# Patient Record
Sex: MALE | Race: WHITE | NOT HISPANIC OR LATINO | Employment: FULL TIME | URBAN - METROPOLITAN AREA
[De-identification: names, ages, dates, MRNs, and addresses within clinical notes are randomized per-mention and may not be internally consistent; named-entity substitution may affect disease eponyms.]

---

## 2017-09-08 ENCOUNTER — COMMUNICATION - HEALTHEAST (OUTPATIENT)
Dept: FAMILY MEDICINE | Facility: CLINIC | Age: 47
End: 2017-09-08

## 2017-09-08 DIAGNOSIS — I10 ESSENTIAL HYPERTENSION: ICD-10-CM

## 2017-09-18 ENCOUNTER — COMMUNICATION - HEALTHEAST (OUTPATIENT)
Dept: TELEHEALTH | Facility: CLINIC | Age: 47
End: 2017-09-18

## 2017-10-06 ENCOUNTER — COMMUNICATION - HEALTHEAST (OUTPATIENT)
Dept: FAMILY MEDICINE | Facility: CLINIC | Age: 47
End: 2017-10-06

## 2017-10-12 ENCOUNTER — OFFICE VISIT - HEALTHEAST (OUTPATIENT)
Dept: FAMILY MEDICINE | Facility: CLINIC | Age: 47
End: 2017-10-12

## 2017-10-12 DIAGNOSIS — Z23 NEEDS FLU SHOT: ICD-10-CM

## 2017-10-12 DIAGNOSIS — I10 ESSENTIAL HYPERTENSION: ICD-10-CM

## 2017-10-12 DIAGNOSIS — J30.9 ALLERGIC RHINITIS: ICD-10-CM

## 2017-10-12 ASSESSMENT — MIFFLIN-ST. JEOR: SCORE: 1743.78

## 2017-10-12 NOTE — ASSESSMENT & PLAN NOTE
Uncontrolled on current regimen.  Medication changes made today from HCTZ alone to lisinopril HCTZ.  Patient to monitor home blood pressures with at home cuff for 2 weeks and send us numbers through my chart.  If still running high we will have him come in for a nurse check or follow-up.  Let us know if experiencing side effects to medications.

## 2017-10-17 ENCOUNTER — AMBULATORY - HEALTHEAST (OUTPATIENT)
Dept: FAMILY MEDICINE | Facility: CLINIC | Age: 47
End: 2017-10-17

## 2017-10-17 DIAGNOSIS — R73.9 HIGH BLOOD SUGAR: ICD-10-CM

## 2018-04-17 ENCOUNTER — COMMUNICATION - HEALTHEAST (OUTPATIENT)
Dept: FAMILY MEDICINE | Facility: CLINIC | Age: 48
End: 2018-04-17

## 2018-04-17 DIAGNOSIS — I10 ESSENTIAL HYPERTENSION: ICD-10-CM

## 2018-10-23 ENCOUNTER — COMMUNICATION - HEALTHEAST (OUTPATIENT)
Dept: FAMILY MEDICINE | Facility: CLINIC | Age: 48
End: 2018-10-23

## 2018-10-23 DIAGNOSIS — I10 ESSENTIAL HYPERTENSION: ICD-10-CM

## 2018-11-25 ENCOUNTER — COMMUNICATION - HEALTHEAST (OUTPATIENT)
Dept: FAMILY MEDICINE | Facility: CLINIC | Age: 48
End: 2018-11-25

## 2018-11-25 DIAGNOSIS — I10 ESSENTIAL HYPERTENSION: ICD-10-CM

## 2018-11-30 ENCOUNTER — OFFICE VISIT - HEALTHEAST (OUTPATIENT)
Dept: FAMILY MEDICINE | Facility: CLINIC | Age: 48
End: 2018-11-30

## 2018-11-30 DIAGNOSIS — M54.41 ACUTE BILATERAL LOW BACK PAIN WITH RIGHT-SIDED SCIATICA: ICD-10-CM

## 2018-11-30 DIAGNOSIS — I10 ESSENTIAL HYPERTENSION: ICD-10-CM

## 2018-11-30 DIAGNOSIS — J30.9 ALLERGIC RHINITIS: ICD-10-CM

## 2018-11-30 LAB
ANION GAP SERPL CALCULATED.3IONS-SCNC: 13 MMOL/L (ref 5–18)
BUN SERPL-MCNC: 19 MG/DL (ref 8–22)
CALCIUM SERPL-MCNC: 9.9 MG/DL (ref 8.5–10.5)
CHLORIDE BLD-SCNC: 97 MMOL/L (ref 98–107)
CO2 SERPL-SCNC: 23 MMOL/L (ref 22–31)
CREAT SERPL-MCNC: 0.89 MG/DL (ref 0.7–1.3)
GFR SERPL CREATININE-BSD FRML MDRD: >60 ML/MIN/1.73M2
GLUCOSE BLD-MCNC: 86 MG/DL (ref 70–125)
POTASSIUM BLD-SCNC: 3.6 MMOL/L (ref 3.5–5)
SODIUM SERPL-SCNC: 133 MMOL/L (ref 136–145)

## 2019-12-03 ENCOUNTER — COMMUNICATION - HEALTHEAST (OUTPATIENT)
Dept: FAMILY MEDICINE | Facility: CLINIC | Age: 49
End: 2019-12-03

## 2019-12-03 DIAGNOSIS — I10 ESSENTIAL HYPERTENSION: ICD-10-CM

## 2020-02-27 ENCOUNTER — OFFICE VISIT - HEALTHEAST (OUTPATIENT)
Dept: FAMILY MEDICINE | Facility: CLINIC | Age: 50
End: 2020-02-27

## 2020-02-27 DIAGNOSIS — Z72.0 NICOTINE ABUSE: ICD-10-CM

## 2020-02-27 DIAGNOSIS — I10 ESSENTIAL HYPERTENSION: ICD-10-CM

## 2020-02-27 DIAGNOSIS — J30.9 ALLERGIC RHINITIS: ICD-10-CM

## 2020-02-27 LAB
ANION GAP SERPL CALCULATED.3IONS-SCNC: 11 MMOL/L (ref 5–18)
BUN SERPL-MCNC: 17 MG/DL (ref 8–22)
CALCIUM SERPL-MCNC: 9.5 MG/DL (ref 8.5–10.5)
CHLORIDE BLD-SCNC: 98 MMOL/L (ref 98–107)
CO2 SERPL-SCNC: 24 MMOL/L (ref 22–31)
CREAT SERPL-MCNC: 0.83 MG/DL (ref 0.7–1.3)
GFR SERPL CREATININE-BSD FRML MDRD: >60 ML/MIN/1.73M2
GLUCOSE BLD-MCNC: 82 MG/DL (ref 70–125)
POTASSIUM BLD-SCNC: 3.9 MMOL/L (ref 3.5–5)
SODIUM SERPL-SCNC: 133 MMOL/L (ref 136–145)

## 2020-02-28 ENCOUNTER — COMMUNICATION - HEALTHEAST (OUTPATIENT)
Dept: FAMILY MEDICINE | Facility: CLINIC | Age: 50
End: 2020-02-28

## 2020-06-19 ENCOUNTER — OFFICE VISIT - HEALTHEAST (OUTPATIENT)
Dept: FAMILY MEDICINE | Facility: CLINIC | Age: 50
End: 2020-06-19

## 2020-06-19 DIAGNOSIS — H61.22 IMPACTED CERUMEN OF LEFT EAR: ICD-10-CM

## 2020-06-19 ASSESSMENT — MIFFLIN-ST. JEOR: SCORE: 1710.61

## 2020-08-05 ENCOUNTER — COMMUNICATION - HEALTHEAST (OUTPATIENT)
Dept: FAMILY MEDICINE | Facility: CLINIC | Age: 50
End: 2020-08-05

## 2020-08-05 ENCOUNTER — OFFICE VISIT - HEALTHEAST (OUTPATIENT)
Dept: FAMILY MEDICINE | Facility: CLINIC | Age: 50
End: 2020-08-05

## 2020-08-05 DIAGNOSIS — I10 ESSENTIAL HYPERTENSION: ICD-10-CM

## 2020-08-05 DIAGNOSIS — Z00.00 HEALTH CARE MAINTENANCE: ICD-10-CM

## 2020-08-05 DIAGNOSIS — Z12.5 SCREENING PSA (PROSTATE SPECIFIC ANTIGEN): ICD-10-CM

## 2020-08-05 DIAGNOSIS — Z76.89 SLEEP CONCERN: ICD-10-CM

## 2020-08-05 DIAGNOSIS — Z12.11 SCREEN FOR COLON CANCER: ICD-10-CM

## 2020-08-05 DIAGNOSIS — E87.1 HYPONATREMIA: ICD-10-CM

## 2020-08-05 DIAGNOSIS — Z72.0 NICOTINE ABUSE: ICD-10-CM

## 2020-08-05 LAB
ALBUMIN SERPL-MCNC: 4.4 G/DL (ref 3.5–5)
ALP SERPL-CCNC: 83 U/L (ref 45–120)
ALT SERPL W P-5'-P-CCNC: 34 U/L (ref 0–45)
ANION GAP SERPL CALCULATED.3IONS-SCNC: 11 MMOL/L (ref 5–18)
AST SERPL W P-5'-P-CCNC: 25 U/L (ref 0–40)
BILIRUB SERPL-MCNC: 0.6 MG/DL (ref 0–1)
BUN SERPL-MCNC: 14 MG/DL (ref 8–22)
CALCIUM SERPL-MCNC: 10.1 MG/DL (ref 8.5–10.5)
CHLORIDE BLD-SCNC: 96 MMOL/L (ref 98–107)
CHOLEST SERPL-MCNC: 203 MG/DL
CO2 SERPL-SCNC: 25 MMOL/L (ref 22–31)
CREAT SERPL-MCNC: 1 MG/DL (ref 0.7–1.3)
FASTING STATUS PATIENT QL REPORTED: YES
GFR SERPL CREATININE-BSD FRML MDRD: >60 ML/MIN/1.73M2
GLUCOSE BLD-MCNC: 94 MG/DL (ref 70–125)
HDLC SERPL-MCNC: 61 MG/DL
LDLC SERPL CALC-MCNC: 126 MG/DL
POTASSIUM BLD-SCNC: 4.8 MMOL/L (ref 3.5–5)
PROT SERPL-MCNC: 7.2 G/DL (ref 6–8)
PSA SERPL-MCNC: 0.6 NG/ML (ref 0–3.5)
SODIUM SERPL-SCNC: 132 MMOL/L (ref 136–145)
TRIGL SERPL-MCNC: 79 MG/DL

## 2020-08-05 ASSESSMENT — MIFFLIN-ST. JEOR: SCORE: 1699.38

## 2020-08-06 ENCOUNTER — COMMUNICATION - HEALTHEAST (OUTPATIENT)
Dept: FAMILY MEDICINE | Facility: CLINIC | Age: 50
End: 2020-08-06

## 2020-08-18 ENCOUNTER — NURSE TRIAGE (OUTPATIENT)
Dept: NURSING | Facility: CLINIC | Age: 50
End: 2020-08-18

## 2020-08-19 ENCOUNTER — OFFICE VISIT - HEALTHEAST (OUTPATIENT)
Dept: FAMILY MEDICINE | Facility: CLINIC | Age: 50
End: 2020-08-19

## 2020-08-19 DIAGNOSIS — J02.9 SORE THROAT: ICD-10-CM

## 2020-08-19 DIAGNOSIS — R06.02 SHORTNESS OF BREATH: ICD-10-CM

## 2020-08-19 DIAGNOSIS — R50.9 FEVER AND CHILLS: ICD-10-CM

## 2020-08-19 DIAGNOSIS — J02.0 ACUTE STREPTOCOCCAL PHARYNGITIS: ICD-10-CM

## 2020-08-19 LAB — DEPRECATED S PYO AG THROAT QL EIA: ABNORMAL

## 2020-08-21 ENCOUNTER — COMMUNICATION - HEALTHEAST (OUTPATIENT)
Dept: SCHEDULING | Facility: CLINIC | Age: 50
End: 2020-08-21

## 2020-08-24 ENCOUNTER — COMMUNICATION - HEALTHEAST (OUTPATIENT)
Dept: SLEEP MEDICINE | Facility: CLINIC | Age: 50
End: 2020-08-24

## 2021-03-06 ENCOUNTER — COMMUNICATION - HEALTHEAST (OUTPATIENT)
Dept: FAMILY MEDICINE | Facility: CLINIC | Age: 51
End: 2021-03-06

## 2021-03-06 DIAGNOSIS — I10 ESSENTIAL HYPERTENSION: ICD-10-CM

## 2021-03-07 RX ORDER — LISINOPRIL AND HYDROCHLOROTHIAZIDE 20; 25 MG/1; MG/1
1 TABLET ORAL DAILY
Qty: 90 TABLET | Refills: 1 | Status: SHIPPED | OUTPATIENT
Start: 2021-03-07 | End: 2021-09-28

## 2021-03-10 ENCOUNTER — COMMUNICATION - HEALTHEAST (OUTPATIENT)
Dept: FAMILY MEDICINE | Facility: CLINIC | Age: 51
End: 2021-03-10

## 2021-03-10 DIAGNOSIS — J30.9 ALLERGIC RHINITIS: ICD-10-CM

## 2021-04-09 ENCOUNTER — RECORDS - HEALTHEAST (OUTPATIENT)
Dept: ADMINISTRATIVE | Facility: OTHER | Age: 51
End: 2021-04-09

## 2021-05-25 ENCOUNTER — RECORDS - HEALTHEAST (OUTPATIENT)
Dept: ADMINISTRATIVE | Facility: CLINIC | Age: 51
End: 2021-05-25

## 2021-05-30 ENCOUNTER — RECORDS - HEALTHEAST (OUTPATIENT)
Dept: ADMINISTRATIVE | Facility: CLINIC | Age: 51
End: 2021-05-30

## 2021-05-31 VITALS — HEIGHT: 74 IN | BODY MASS INDEX: 23.17 KG/M2 | WEIGHT: 180.56 LBS

## 2021-06-02 VITALS — BODY MASS INDEX: 22.65 KG/M2 | WEIGHT: 176.38 LBS

## 2021-06-03 NOTE — TELEPHONE ENCOUNTER
RN cannot approve Refill Request    RN can NOT refill this medication PCP messaged that patient is overdue for Labs and Office Visit. Last office visit: 11/30/2018 Umm Oneill FNP Last Physical: Visit date not found Last MTM visit: Visit date not found Last visit same specialty: 11/30/2018 Umm Oneill FNP.  Next visit within 3 mo: Visit date not found  Next physical within 3 mo: Visit date not found      Maribel Sousa, [facility] Triage/Med Refill 12/3/2019    Requested Prescriptions   Pending Prescriptions Disp Refills     lisinopril-hydrochlorothiazide (PRINZIDE,ZESTORETIC) 20-25 mg per tablet [Pharmacy Med Name: Lisinopril-hydroCHLOROthiazide Oral Tablet 20-25 MG] 30 tablet 2     Sig: Take 1 tablet by mouth daily.       Diuretics/Combination Diuretics Refill Protocol  Failed - 12/3/2019  7:02 AM        Failed - Visit with PCP or prescribing provider visit in past 12 months     Last office visit with prescriber/PCP: 11/30/2018 Umm Oneill FNP OR same dept: Visit date not found OR same specialty: 11/30/2018 Umm Oneill FNP  Last physical: Visit date not found Last MTM visit: Visit date not found   Next visit within 3 mo: Visit date not found  Next physical within 3 mo: Visit date not found  Prescriber OR PCP: BERTA Pineda  Last diagnosis associated with med order: 1. Essential hypertension  - lisinopril-hydrochlorothiazide (PRINZIDE,ZESTORETIC) 20-25 mg per tablet [Pharmacy Med Name: Lisinopril-hydroCHLOROthiazide Oral Tablet 20-25 MG]; Take 1 tablet by mouth daily.  Dispense: 30 tablet; Refill: 2    If protocol passes may refill for 12 months if within 3 months of last provider visit (or a total of 15 months).             Failed - Serum Potassium in past 12 months      No results found for: LN-POTASSIUM          Failed - Serum Sodium in past 12 months      No results found for: LN-SODIUM          Failed - Serum Creatinine in past 12 months      Creatinine   Date Value Ref Range Status   11/30/2018 0.89 0.70 -  1.30 mg/dL Final             Passed - Blood pressure on file in past 12 months     BP Readings from Last 1 Encounters:   07/28/19 116/68

## 2021-06-04 VITALS
SYSTOLIC BLOOD PRESSURE: 132 MMHG | DIASTOLIC BLOOD PRESSURE: 88 MMHG | BODY MASS INDEX: 22.23 KG/M2 | WEIGHT: 173.25 LBS | RESPIRATION RATE: 16 BRPM | TEMPERATURE: 98.4 F | HEIGHT: 74 IN | HEART RATE: 86 BPM

## 2021-06-04 VITALS
HEART RATE: 94 BPM | WEIGHT: 175.6 LBS | TEMPERATURE: 97.5 F | RESPIRATION RATE: 16 BRPM | BODY MASS INDEX: 22.55 KG/M2 | SYSTOLIC BLOOD PRESSURE: 122 MMHG | DIASTOLIC BLOOD PRESSURE: 86 MMHG

## 2021-06-04 VITALS
WEIGHT: 173.4 LBS | SYSTOLIC BLOOD PRESSURE: 121 MMHG | HEART RATE: 92 BPM | DIASTOLIC BLOOD PRESSURE: 80 MMHG | RESPIRATION RATE: 16 BRPM | BODY MASS INDEX: 22.98 KG/M2 | HEIGHT: 73 IN | TEMPERATURE: 97.7 F

## 2021-06-04 VITALS
BODY MASS INDEX: 22.41 KG/M2 | TEMPERATURE: 99.2 F | OXYGEN SATURATION: 99 % | HEART RATE: 104 BPM | RESPIRATION RATE: 18 BRPM | DIASTOLIC BLOOD PRESSURE: 76 MMHG | WEIGHT: 171 LBS | SYSTOLIC BLOOD PRESSURE: 106 MMHG

## 2021-06-06 NOTE — PROGRESS NOTES
DIAGNOSIS:  1. Essential hypertension                          The diagnosis was confirmed.  The condition is stable/controlled on LISINOPRIL-HCTZ and no side effects  have been noted.  The appropriate follow up labs  ( BMP )have been ordered  (OR ARE UP TO DATE) and medication refills ordered if requested.   lisinopriL-hydrochlorothiazide (PRINZIDE,ZESTORETIC) 20-25 mg per tablet    Basic Metabolic Panel   2. Allergic rhinitis   Stable on flonase fluticasone propionate (FLONASE) 50 mcg/actuation nasal spray   3. Nicotine abuse   Uses nicotine chew,  Will try losenges to get off.  Further discuss at upcoming physical        PLAN:     Full physical after age 50.  We will do fasting labs (CMP,  FLP)  and PSA at that time    Check BMPtoday    meds refilled    Quit chew.   Try losenges.   Consider chantix.            HPI:  estab care.  No issues with meds.  Dad recently dx with prostate cancer at 74  Chews a can a week.  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  So significant joint pain.        Current Outpatient Medications on File Prior to Visit   Medication Sig Dispense Refill     [DISCONTINUED] fluticasone (FLONASE) 50 mcg/actuation nasal spray 1 spray into each nostril daily. 16 g 5     [DISCONTINUED] lisinopril-hydrochlorothiazide (PRINZIDE,ZESTORETIC) 20-25 mg per tablet Take 1 tablet by mouth daily. 90 tablet 0     blood pressure monitor Kit Use 1 Device As Directed daily. 1 each 0     No current facility-administered medications on file prior to visit.        Pmh: reviewed  Psh: reviewed  Allergy:  reviewed      EXAM:    /86   Pulse 94   Temp 97.5  F (36.4  C) (Oral)   Resp 16   Wt 175 lb 9.6 oz (79.7 kg)   BMI 22.55 kg/m    GEN:   ALERT, NAD, ORIENTED TIMES THREE  NECK: SUPPLE WITHOUT ADENOPATHY OR THYROMEGALY  LUNGS: CTA  COR: RRR WITHOUT MURMUR  SKIN: NO RASH , ULCERS OR LESIONS NOTED  EXT: WITHOUT EDEMA/SWELLING    No results found for this or any previous visit (from the past 168  hour(s)).

## 2021-06-09 NOTE — PROGRESS NOTES
"Assessment/Plan:    Adelfo Fierro is a 50 y.o. male presenting for:    1. Impacted cerumen of left ear  I was able to curette a significant amount of cerumen today.  I did have my assistant come and rinse his ear as well.  Patient had resolution of his symptoms for the most part.  I did discuss that with seasonal allergies he may have a component of serous otitis as well and we discussed how to combat that.        There are no discontinued medications.        Chief Complaint:  Chief Complaint   Patient presents with     Ear Fullness     Bilateral plugged ears- hard time hearing       Subjective:   Adelfo Fierro is a pleasant 50-year-old gentleman presenting to the clinic today with concerns over a clogged feeling in his left ear.  He states that occasionally his ear will \"pop\" and he will be able to hear a bit better.  He does have seasonal allergies.    He states that at one point he has had severe on the left washed out.  His ear is not draining.  It is not painful.    12 point review of systems completed and negative except for what has been described above    Social History     Tobacco Use   Smoking Status Former Smoker   Smokeless Tobacco Current User     Types: Chew       Current Outpatient Medications   Medication Sig     blood pressure monitor Kit Use 1 Device As Directed daily.     fluticasone propionate (FLONASE) 50 mcg/actuation nasal spray 1 spray into each nostril daily.     lisinopriL-hydrochlorothiazide (PRINZIDE,ZESTORETIC) 20-25 mg per tablet Take 1 tablet by mouth daily.         Objective:  Vitals:    06/19/20 1356   BP: 132/88   Pulse: 86   Resp: 16   Temp: 98.4  F (36.9  C)   TempSrc: Oral   Weight: 173 lb 4 oz (78.6 kg)   Height: 6' 2\" (1.88 m)       Body mass index is 22.24 kg/m .    Vital signs reviewed and stable  General: No acute distress  Psych: Appropriate affect  HEENT: moist mucous membranes, tympanic membrane on the right is pearly gray with no cerumen, tympanic membrane on the left " is completely obscured by copious amounts of thick and sticky cerumen.      Cerumen was curetted by myself and lavaged by my assistant today.  Partial visualization of the tympanic membrane appears normal.  Lymph: no cervical or supraclavicular lymphadenopathy    Skin: warm and dry with no rash         This note has been dictated and transcribed using voice recognition software.   Any errors in transcription are unintentional and inherent to the software.

## 2021-06-10 NOTE — PATIENT INSTRUCTIONS - HE
Check with insurance on Shingrix coverage (for Shingles prevention)    Schedule colonoscopy    Fasting labs    Referral for a sleep study    I have counseled the patient for tobacco cessation and the follow up will occur  at the next visit.    
ringing in ear, "sensation" in head

## 2021-06-10 NOTE — PROGRESS NOTES
[facility] Note                                                        Date of Visit: 8/19/2020     Chief Complaint   Adelfo Fierro is a(n) 50 y.o. White or  male who presents to [facility] with the following complaint(s):  Sore Throat (x 1 wk  Patient is currently taking antibiotics prescribed on 8/15/2020); Dizziness (x 1 Day); Extremity Weakness (x 3 Days ); Chills (last sunday Temp today is 99.2 Patient is currently taking Tylenol and Ibufrofen); and Suspected Covid (Patient reported being out of State last wknd x 3 Days  in [place])       Assessment and Plan   1. Acute streptococcal pharyngitis    2. Sore throat  - Symptomatic COVID-19 Virus (CORONAVIRUS) PCR; Future  - Rapid Strep A Screen-Throat  - Symptomatic COVID-19 Virus (CORONAVIRUS) PCR  - COVID-19 Virus PCR MRF    3. Fever and chills    - Symptomatic COVID-19 Virus (CORONAVIRUS) PCR; Future  - Symptomatic COVID-19 Virus (CORONAVIRUS) PCR  - COVID-19 Virus PCR MRF    4. Shortness of breath  - Symptomatic COVID-19 Virus (CORONAVIRUS) PCR; Future  - Symptomatic COVID-19 Virus (CORONAVIRUS) PCR  - COVID-19 Virus PCR MRF      Patient with hypertension and seasonal allergies presenting with 1-week history of sore throat and requesting testing for strep. He was seen at [facility] in [place], [place] on 8/15/2020, where strep testing was not completed but he was empirically treated for bacterial pharyngitis with amoxicillin 875 mg two times a day x 10 days; records reviewed through Care Everywhere. Sore throat has gradually improved since starting the amoxicillin, but he continues to experience additional symptoms of ongoing low-grade fevers, headache, dizziness, fatigue, and mild shortness of breath. 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 Appearance of the oropharynx is more consistent with herpangina that strep, but rapid strep testing is actually positive today. Instructed patient to complete the full course of amoxicillin as prescribed on 8/15/2020. Recommended use of a probiotic while taking this antibiotic. Discussed symptomatic / supportive cares and prevention of reinfection. Ruling out concurrent COVID-19 infection. Recommend home isolation pending this result. Work excuse provided for 8/19/2020-8/21/2020.     Counseled patient regarding assessment and plan for evaluation and treatment. Questions were answered. See AVS for the specific written instructions and educational handout(s) regarding strep pharyngitis and COVID-19 that were provided at the conclusion of the visit.     Discussed signs / symptoms that warrant urgent / emergent medical attention.     Follow up with Primary Care in 5 days if symptoms persist.     History of Present Illness   Primary symptom: Sore Throat / Cold / Cough / Flu  Onset: 1 week ago  Progression: Developed sore throat 1 week ago. Sore throat worsened significantly over the next 2 days.   Fevers: Low-grade, with ibuprofen in his system, Tmax  99.2 F  Chills: 4 day ago only  Sore throat: Yes, severe, now improving  Dysgeusia: No  Anosmia: No  Nasal congestion: Mild, related to seasonal allergies  Rhinorrhea: No  Ear pain: No  Headache: Yes  Body aches: Yes, especially in the legs. Does not appreciated swelling in the legs.   Cough: No  Shortness of breath: Mild  GI symptoms: None  Rash: No  Additional symptoms: Fatigue and dizziness for the past 3 days.   Home therapies utilized: Has been taking ibuprofen 800 mg every 4-5 hours. Not taking acetaminophen. Using cough drops. Was seen at an Urgent Care in [place] on 8/15/2020, where strep testing was not completed and amoxicillin 875 mg two times a day x 10 days was empirically prescribed.   Underlying  lung disease: No  Exposure to strep: No  Exposure to influenza: No  Exposure to COVID-19: No  Other ill contacts: No family members have been ill. No known exposure to Mono in recent months.   Recent travel: Travelled to / from [place] this past weekend, while ill, to move his two daughters in at college. He found out today that the mother of one of his daughter's sorority sisters has since tested positive for COVID-19.      Review of Systems   Review of Systems   All other systems reviewed and are negative.       Physical Exam   Vitals:    08/19/20 1347   BP: 106/76   Patient Site: Right Arm   Patient Position: Sitting   Cuff Size: Adult Regular   Pulse: (!) 104   Resp: 18   Temp: 99.2  F (37.3  C)   TempSrc: Oral   SpO2: 99%   Weight: 171 lb (77.6 kg)     Physical Exam  Vitals signs and nursing note reviewed.   Constitutional:       General: He is not in acute distress.     Appearance: He is well-developed and normal weight. He is not ill-appearing, toxic-appearing or diaphoretic.   HENT:      Head: Normocephalic and atraumatic.      Right Ear: Tympanic membrane, ear canal and external ear normal.      Left Ear: Tympanic membrane, ear canal and external ear normal.      Nose: No mucosal edema or rhinorrhea.      Mouth/Throat:      Mouth: Mucous membranes are moist. No oral lesions.      Palate: Lesions (several gray ulcerations with erythematous bases) present.      Pharynx: Uvula midline. Posterior oropharyngeal erythema present. No oropharyngeal exudate or uvula swelling.      Tonsils: No tonsillar exudate or tonsillar abscesses. 1+ on the right. 1+ on the left.   Eyes:      General: Lids are normal.      Conjunctiva/sclera: Conjunctivae normal.   Neck:      Musculoskeletal: Neck supple. No edema or erythema.   Cardiovascular:      Rate and Rhythm: Normal rate and regular rhythm.      Heart sounds: S1 normal and S2 normal. No murmur. No friction rub. No gallop.    Pulmonary:      Effort: Pulmonary effort is  normal.      Breath sounds: Normal breath sounds. No stridor. No wheezing, rhonchi or rales.   Lymphadenopathy:      Cervical: No cervical adenopathy.   Skin:     General: Skin is warm and dry.      Coloration: Skin is not pale.      Findings: No rash.   Neurological:      General: No focal deficit present.      Mental Status: He is alert and oriented to person, place, and time.          Diagnostic Studies   Laboratory:  Results for orders placed or performed in visit on 08/19/20   Rapid Strep A Screen-Throat    Specimen: Throat   Result Value Ref Range    Rapid Strep A Antigen Group A Strep detected (!) No Group A Strep detected, presumptive negative     Radiology:  N/A    Electrocardiogram:  N/A     Procedure Note   N/A     Pertinent History   The following portions of the patient's history were reviewed and updated as appropriate: allergies, current medications, past family history, past medical history, past social history, past surgical history and problem list.    Patient has Essential hypertension and Allergic rhinitis on their problem list.    Patient has a past medical history of Essential hypertension.    Patient has no past surgical history on file.    Patient's family history includes Breast cancer in his maternal grandmother; Heart disease (age of onset: 80) in his paternal grandfather; Hypertension in his father, mother, and paternal grandfather; Stroke (age of onset: 45) in his maternal uncle.    Patient reports that he has quit smoking. His smokeless tobacco use includes chew. He reports current alcohol use of about 14.0 standard drinks of alcohol per week.     Portions of this note have been dictated using voice recognition software. Any grammatical or contextual distortions are unintentional and inherent to the software.    Gerardo Sahni MD  [facility]

## 2021-06-10 NOTE — TELEPHONE ENCOUNTER
----- Message from Graeme Arriola MD sent at 8/5/2020  8:38 PM CDT -----  Rey Emanuel:  Your baseline PSA is NORMAL.  We will recheck this at your next yearly physical.  The sodium continues to run slightly low (one point lower than last time).  As we said last time this is likely secondary to your BP med.  If it were to run any lower then we would want to switch up your BP med.  I would suggest a recheck or your sodium in 6 months.  The cholesterol panel is excellent.  The remaining labs are NORMAL.

## 2021-06-13 NOTE — PROGRESS NOTES
ASSESSMENT:  1. Essential hypertension  Basic Metabolic Panel   2. Allergic rhinitis  fluticasone (FLONASE) 50 mcg/actuation nasal spray   3. Needs flu shot           PLAN:  Flu shot given by nursing.    Essential hypertension  Uncontrolled on current regimen.  Medication changes made today from HCTZ alone to lisinopril HCTZ.  Patient to monitor home blood pressures with at home cuff for 2 weeks and send us numbers through my chart.  If still running high we will have him come in for a nurse check or follow-up.  Let us know if experiencing side effects to medications.    Allergic rhinitis  Under good control if uses Flonase on a regular basis.  Refill provided today for as needed use.      There are no Patient Instructions on file for this visit.    Orders Placed This Encounter   Procedures     Influenza, Seasonal,Quad Inj, 36+ MOS     Basic Metabolic Panel     Medications Discontinued During This Encounter   Medication Reason     fluticasone (FLONASE) 50 mcg/actuation nasal spray Reorder     oxyCODONE (ROXICODONE) 5 MG immediate release tablet Therapy completed       No Follow-up on file.    CHIEF COMPLAINT:  Chief Complaint   Patient presents with     Establish Care     Medication Management       HISTORY OF PRESENT ILLNESS:  Adelfo is a 47 y.o. male today to establish care and for medication check.  She with history of allergies and hypertension, otherwise healthy and no other chronic medical conditions.  Currently taking HCTZ for hypertension, but blood pressure is high today.  The last few times he has checked he is noticed it to be high and wonders if he should change medications.  Otherwise he has been feeling well.  He requests refill on his Flonase today.  He had a clavicle fracture lastSummer, however pain and problems related to that have resolved.  Otherwise medical history quite benign, no surgical history.  Family history was reviewed and updated.  He would like to get flu shot today as well as he is  "here.    REVIEW OF SYSTEMS:      Pertinent items are noted in HPI.  All other systems are negative  PFSH:  Family History   Problem Relation Age of Onset     Hypertension Mother      Hypertension Father      Stroke Maternal Uncle 45          Breast cancer Maternal Grandmother      Heart disease Paternal Grandfather 80     MI     Hypertension Paternal Grandfather          TOBACCO USE:  History   Smoking Status     Former Smoker   Smokeless Tobacco     Current User     Types: Chew       VITALS:  Vitals:    10/12/17 0959 10/12/17 1024   BP: (!) 142/110 (!) 150/110   Patient Site: Right Arm    Patient Position: Sitting    Cuff Size: Adult Regular    Pulse: 72    Resp: 16    Temp: 97.8  F (36.6  C)    TempSrc: Oral    Weight: 180 lb 9 oz (81.9 kg)    Height: 6' 2\" (1.88 m)      Wt Readings from Last 3 Encounters:   10/12/17 180 lb 9 oz (81.9 kg)   16 177 lb 4 oz (80.4 kg)   16 175 lb 12 oz (79.7 kg)       PHYSICAL EXAM:   BP (!) 150/110  Pulse 72  Temp 97.8  F (36.6  C) (Oral)   Resp 16  Ht 6' 2\" (1.88 m)  Wt 180 lb 9 oz (81.9 kg)  BMI 23.18 kg/m2  General appearance: alert, appears stated age and cooperative  Lungs: clear to auscultation bilaterally  Heart: regular rate and rhythm, S1, S2 normal, no murmur, click, rub or gallop    DATA REVIEWED:  Additional History from Old Records Summarized (2): 0   Decision to Obtain Records (1): 0  Radiology Tests Summarized or Ordered (1): 0  Labs Reviewed or Ordered (1): 1  Medicine Test Summarized or Ordered (1): 0  Independent Review of EKG or X-RAY(2 each): 0    The visit lasted a total of 25 minutes face to face with the patient. Over 50% of the time was spent counseling and educating the patient about plan of care.    MEDICATIONS:  Current Outpatient Prescriptions   Medication Sig Dispense Refill     hydroCHLOROthiazide (HYDRODIURIL) 25 MG tablet TAKE 1 TABLET (25 MG TOTAL) BY MOUTH DAILY. 30 tablet 0     blood pressure monitor Kit Use 1 Device As " Directed daily. 1 each 0     fluticasone (FLONASE) 50 mcg/actuation nasal spray 1 spray into each nostril daily. 16 g 5     lisinopril-hydrochlorothiazide (PRINZIDE,ZESTORETIC) 20-25 mg per tablet Take 1 tablet by mouth daily. 30 tablet 5     No current facility-administered medications for this visit.        This note has been dictated using voice recognition software. Any grammatical or context distortions are unintentional and inherent to the software

## 2021-06-15 NOTE — TELEPHONE ENCOUNTER
Refill Approved    Rx renewed per Medication Renewal Policy. Medication was last renewed on 2/27/20.    Graeme Flores, [facility] Connection Triage/Med Refill 3/7/2021     Requested Prescriptions   Pending Prescriptions Disp Refills     lisinopriL-hydrochlorothiazide (PRINZIDE,ZESTORETIC) 20-25 mg per tablet [Pharmacy Med Name: Lisinopril-hydroCHLOROthiazide Oral Tablet 20-25 MG] 90 tablet 0     Sig: Take 1 tablet by mouth daily.       Diuretics/Combination Diuretics Refill Protocol  Passed - 3/6/2021  2:01 AM        Passed - Visit with PCP or prescribing provider visit in past 12 months     Last office visit with prescriber/PCP: 2/27/2020 Graeme Arriola MD OR same dept: 6/19/2020 Debbie Mott MD OR same specialty: 6/19/2020 Debbie Mott MD  Last physical: 8/5/2020 Last MTM visit: Visit date not found   Next visit within 3 mo: Visit date not found  Next physical within 3 mo: Visit date not found  Prescriber OR PCP: Graeme Arriola MD  Last diagnosis associated with med order: 1. Essential hypertension  - lisinopriL-hydrochlorothiazide (PRINZIDE,ZESTORETIC) 20-25 mg per tablet [Pharmacy Med Name: Lisinopril-hydroCHLOROthiazide Oral Tablet 20-25 MG]; Take 1 tablet by mouth daily.  Dispense: 90 tablet; Refill: 0    If protocol passes may refill for 12 months if within 3 months of last provider visit (or a total of 15 months).             Passed - Serum Potassium in past 12 months      Lab Results   Component Value Date    Potassium 4.8 08/05/2020             Passed - Serum Sodium in past 12 months      Lab Results   Component Value Date    Sodium 132 (L) 08/05/2020             Passed - Blood pressure on file in past 12 months     BP Readings from Last 1 Encounters:   08/19/20 106/76             Passed - Serum Creatinine in past 12 months      Creatinine   Date Value Ref Range Status   08/05/2020 1.00 0.70 - 1.30 mg/dL Final

## 2021-06-15 NOTE — TELEPHONE ENCOUNTER
Refill Approved    Rx renewed per Medication Renewal Policy. Medication was last renewed on 2/27/20.    Graeme Flores, [facility] Connection Triage/Med Refill 3/11/2021     Requested Prescriptions   Pending Prescriptions Disp Refills     fluticasone propionate (FLONASE) 50 mcg/actuation nasal spray [Pharmacy Med Name: Fluticasone Propionate Nasal Suspension 50 MCG/ACT] 16 g 0     Sig: inhale one spray into each nostril daily       Nasal Steroid Refill Protocol Passed - 3/10/2021  8:41 AM        Passed - Patient has had office visit/physical in last 2 years     Last office visit with prescriber/PCP: 2/27/2020 OR same dept: 6/19/2020 Debbie Mott MD OR same specialty: 6/19/2020 Debbie Mott MD Last physical: 8/5/2020 Last MTM visit: Visit date not found    Next appt within 3 mo: Visit date not found  Next physical within 3 mo: Visit date not found  Prescriber OR PCP: Graeme Arriola MD  Last diagnosis associated with med order: 1. Allergic rhinitis  - fluticasone propionate (FLONASE) 50 mcg/actuation nasal spray [Pharmacy Med Name: Fluticasone Propionate Nasal Suspension 50 MCG/ACT]; inhale one spray into each nostril daily  Dispense: 16 g; Refill: 0     If protocol passes may refill for 12 months if within 3 months of last provider visit (or a total of 15 months).

## 2021-06-16 PROBLEM — J30.9 ALLERGIC RHINITIS: Status: ACTIVE | Noted: 2017-10-12

## 2021-06-18 NOTE — PATIENT INSTRUCTIONS - HE
"Patient Instructions by Gerardo Sahni MD at 8/19/2020  1:40 PM     Author: Gerardo Sahni MD Service: -- Author Type: Physician    Filed: 8/19/2020  2:45 PM Encounter Date: 8/19/2020 Status: Addendum    : Gerardo Sahni MD (Physician)    Related Notes: Original Note by Gerardo Sahni MD (Physician) filed at 8/19/2020  2:44 PM       -Rapid strep test is positive.  -Complete the full course of amoxicillin as prescribed on 8/15/2020 to treat this infection.  -Recommend use of a probiotic while taking this antibiotic to prevent diarrhea.  This product is available over the counter.  -May use acetaminophen and / or ibuprofen as needed for pain and fever.  -Discard your toothbrush 48 hours after starting your antibiotic to prevent reinfection.  -COVID-19 testing is in process.    Discharge Instructions for COVID-19 Patients  You have--or may have--COVID-19. Please follow the instructions listed below.   If you have a weakened immune system, discuss with your doctor any other actions you need to take.  How can I protect others?  If you have symptoms (fever, cough, body aches or trouble breathing):    Stay home and away from others (self-isolate) until:  ? At least 10 days have passed since your symptoms started. And?  ? You've had no fever--and no medicine that reduces fever--for 3 full days (72 hours). And?  ? Your other symptoms have resolved (gotten better).  If you don't show symptoms, but testing showed that you have COVID-19:    Stay home and away from others (self-isolate) until at least 10 days have passed since the date of your first positive COVID-19 test.  During this time    Stay in your own room, even for meals. Use your own bathroom if you can.    Stay away from others in your home. No hugging, kissing or shaking hands. No visitors.    Don't go to work, school or anywhere else.    Clean \"high touch\" surfaces often (doorknobs, counters, handles). Use household cleaning spray or " wipes. You'll find a full list of  on the EPA website: www.epa.gov/pesticide-registration/list-n-disinfectants-use-against-sars-cov-2.    Cover your mouth and nose with a mask, tissue or wash cloth to avoid spreading germs.    Wash your hands and face often. Use soap and water.    Caregivers in these groups are at risk for severe illness due to COVID-19:  ? People 65 years and older  ? People who live in a nursing home or long-term care facility  ? People with chronic disease (lung, heart, cancer, diabetes, kidney, liver, immunologic)  ? People who have a weakened immune system, including those who:    Are in cancer treatment    Take medicine that weakens the immune system, such as corticosteroids    Had a bone marrow or organ transplant    Have an immune deficiency    Have poorly controlled HIV or AIDS    Are obese (body mass index of 40 or higher)    Smoke regularly    Caregivers should wear gloves while washing dishes, handling laundry and cleaning bedrooms and bathrooms.    Use caution when washing and drying laundry: Don't shake dirty laundry and use the warmest water setting that you can.    For more tips on managing your health at home, go to www.cdc.gov/coronavirus/2019-ncov/downloads/10Things.pdf.  How can I take care of myself at home?  1. Get lots of rest. Drink extra fluids (unless a doctor has told you not to).  2. Take Tylenol (acetaminophen) for fever or pain. If you have liver or kidney problems, ask your family doctor if it's okay to take Tylenol.     Adults can take either:  ? 650 mg (two 325 mg pills) every 4 to 6 hours, or?  ? 1,000 mg (two 500 mg pills) every 8 hours as needed.  ? Note: Don't take more than 3,000 mg in one day. Acetaminophen is found in many medicines (both prescribed and over-the-counter medicines). Read all labels to be sure you don't take too much.  For children, check the Tylenol bottle for the right dose. The dose is based on the child's age or weight.  3. If you  have other health problems (like cancer, heart failure, an organ transplant or severe kidney disease): Call your specialty clinic if you don't feel better in the next 2 days.  4. Know when to call 911. Emergency warning signs include:  ? Trouble breathing or shortness of breath  ? Pain or pressure in the chest that doesn't go away  ? Feeling confused like you haven't felt before, or not being able to wake up  ? Bluish-colored lips or face  5. Your doctor may have prescribed a blood thinner medicine. Follow their instructions.  Where can I get more information?    [facility] - About COVID-19:   [email or web address]    CDC - What to Do If You're Sick: www.cdc.gov/coronavirus/2019-ncov/about/steps-when-sick.html    [facility] - Ending Home Isolation: www.cdc.gov/coronavirus/2019-ncov/hcp/disposition-in-home-patients.html    [facility] - Caring for Someone: www.cdc.gov/coronavirus/2019-ncov/if-you-are-sick/care-for-someone.html    [facility] - Interim Guidance for Hospital Discharge to Home: www.[facility].[place]./diseases/coronavirus/hcp/hospdischarge.pdf    [facility] clinical trials (COVID-19 research studies): clinicalaffairs.[facility]-clinical-trials    Below are the COVID-19 hotlines at the [place] [facility] ([facility]). Interpreters are available.  ? For health questions: Call 079-307-0062 or 1-299.731.4050 (7 a.m. to 7 p.m.)  ? For questions about schools and childcare: Call 024-679-9225 or 1-541.926.6132 (7 a.m. to 7 p.m.)    For informational purposes only. Not to replace the advice of your health care provider. Clinically reviewed by the Infection Prevention Team.Copyright   2020 [employer]. All rights reserved. [employer] 154052 - 06/20.    Patient Education     Pharyngitis: Strep (Confirmed)    You have had a positive test for strep throat. Strep throat is a contagious illness. It is spread by coughing, kissing or by touching others after touching your mouth or nose.  Symptoms include throat pain that is worse with swallowing, aching all over, headache, and fever. It is treated with antibiotic medicine. This should help you start to feel better in 1 to 2 days.  Home care    Rest at home. Drink plenty of fluids to you won't get dehydrated.    No work or school for the first 2 days of taking the antibiotics. After this time, you will not be contagious. You can then return to school or work if you are feeling better.     Take antibiotic medicine for the full 10 days, even if you feel better. This is very important to ensure the infection is treated. It is also important to prevent medicine-resistant germs from developing. If you were given an antibiotic shot, you don't need any more antibiotics.    You may use acetaminophen or ibuprofen to control pain or fever, unless another medicine was prescribed for this. Talk with your healthcare provider before taking these medicines if you have chronic liver or kidney disease. Also talk with your healthcare provider if you have had a stomach ulcer or GI bleeding.    Throat lozenges or sprays help reduce pain. Gargling with warm saltwater will also reduce throat pain. Dissolve 1/2 teaspoon of salt in 1 glass of warm water. This may be useful just before meals.     Soft foods are OK. Don't eat salty or spicy foods.  Follow-up care  Follow up with your healthcare provider or our staff if you don't get better over the next week.  When to seek medical advice  Call your healthcare provider right away if any of these occur:    Fever of 100.4 F (38 C) or higher, or as directed by your healthcare provider    New or worsening ear pain, sinus pain, or headache    Painful lumps in the back of neck    Stiff neck    Lymph nodes getting larger or becoming soft in the middle    You can't swallow liquids or you can't open your mouth wide because of throat pain    Signs of dehydration. These include very dark urine or no urine, sunken eyes, and  dizziness.    Trouble breathing or noisy breathing    Muffled voice    Rash  Prevention  Here are steps you can take to help prevent an infection:    Keep good hand washing habits.    Dont have close contact with people who have sore throats, colds, or other upper respiratory infections.    Dont smoke, and stay away from secondhand smoke.  Date Last Reviewed: 11/1/2017 2000-2017 [employer]. [address] [place], [place] [id number]. All rights reserved. This information is not intended as a substitute for professional medical care. Always follow your healthcare professional's instructions.

## 2021-06-20 NOTE — LETTER
Letter by Graeme Arriola MD at      Author: Graeme Arriola MD Service: -- Author Type: --    Filed:  Encounter Date: 2/28/2020 Status: (Other)         Adelfo Fierro  [address] [place] [id number]             February 28, 2020         Dear Mr. Fierro,    Below are the results from your recent visit:    Resulted Orders   Basic Metabolic Panel   Result Value Ref Range    Sodium 133 (L) 136 - 145 mmol/L    Potassium 3.9 3.5 - 5.0 mmol/L    Chloride 98 98 - 107 mmol/L    CO2 24 22 - 31 mmol/L    Anion Gap, Calculation 11 5 - 18 mmol/L    Glucose 82 70 - 125 mg/dL    Calcium 9.5 8.5 - 10.5 mg/dL    BUN 17 8 - 22 mg/dL    Creatinine 0.83 0.70 - 1.30 mg/dL    GFR MDRD Af Amer >60 >60 mL/min/1.73m2    GFR MDRD Non Af Amer >60 >60 mL/min/1.73m2    Narrative    Fasting Glucose reference range is 70-99 mg/dL per  American Diabetes Association (ADA) guidelines.       Rey Emanuel:  Your sodium runs several points low as it has in the past.  This is not a significant lowering and it is likely related to your current BP med.  We can address it further at your physical.  The remaining labs are NORMAL.    Please call with questions or contact us using [facility].    Sincerely,        Electronically signed by Graeme Arriola MD

## 2021-06-20 NOTE — LETTER
Letter by Gerardo Sahni MD at      Author: Gerardo Sahni MD Service: -- Author Type: --    Filed:  Encounter Date: 8/19/2020 Status: (Other)         August 19, 2020     Patient: Adelfo Fierro   YOB: 1970   Date of Visit: 8/19/2020       To Whom it May Concern:    Adelfo Fierro was seen in my clinic on 8/19/2020.  Please excuse his absence from work from 8/19/2020-8/21/2020 due to illness.      If you have any questions or concerns, please don't hesitate to call.    Sincerely,         Electronically signed by Gerardo Sahni MD

## 2021-06-20 NOTE — LETTER
Letter by Graeme Arriola MD at      Author: Graeme Arriola MD Service: -- Author Type: --    Filed:  Encounter Date: 8/5/2020 Status: (Other)         Adelfo Fierro  [address] [facility] [address]             August 5, 2020         Dear Mr. Fierro,    Below are the results from your recent visit:    Resulted Orders   Lipid [place] FASTING   Result Value Ref Range    Cholesterol 203 (H) <=199 mg/dL    Triglycerides 79 <=149 mg/dL    HDL Cholesterol 61 >=40 mg/dL    LDL Calculated 126 <=129 mg/dL    Patient Fasting > 8hrs? Yes    Comprehensive Metabolic Panel   Result Value Ref Range    Sodium 132 (L) 136 - 145 mmol/L    Potassium 4.8 3.5 - 5.0 mmol/L    Chloride 96 (L) 98 - 107 mmol/L    CO2 25 22 - 31 mmol/L    Anion Gap, Calculation 11 5 - 18 mmol/L    Glucose 94 70 - 125 mg/dL    BUN 14 8 - 22 mg/dL    Creatinine 1.00 0.70 - 1.30 mg/dL    GFR MDRD Af Amer >60 >60 mL/min/1.73m2    GFR MDRD Non Af Amer >60 >60 mL/min/1.73m2    Bilirubin, Total 0.6 0.0 - 1.0 mg/dL    Calcium 10.1 8.5 - 10.5 mg/dL    Protein, Total 7.2 6.0 - 8.0 g/dL    Albumin 4.4 3.5 - 5.0 g/dL    Alkaline Phosphatase 83 45 - 120 U/L    AST 25 0 - 40 U/L    ALT 34 0 - 45 U/L    Narrative    Fasting Glucose reference range is 70-99 mg/dL per  American Diabetes Association (ADA) guidelines.   PSA, Annual Screen (Prostatic-Specific Antigen)   Result Value Ref Range    PSA 0.6 0.0 - 3.5 ng/mL    Narrative    Method is Abbott Prostate-Specific Antigen (PSA)  Standard-WHO 1st International (90:10)       Rey Emanuel:  Your baseline PSA is NORMAL.  We will recheck this at your next yearly physical.  The sodium continues to run slightly low (one point lower than last time).  As we said last time this is likely secondary to your BP med.  If it were to run any lower then we would want to switch up your BP med.  I would suggest a recheck or your sodium in 6 months.  The cholesterol panel is excellent.  The remaining labs are NORMAL.    Please  call with questions or contact us using [employer].    Sincerely,        Electronically signed by Graeme Arriola MD

## 2021-06-20 NOTE — LETTER
Letter by Graeme Arriola MD at      Author: Graeme Arriola MD Service: -- Author Type: --    Filed:  Encounter Date: 8/6/2020 Status: (Other)         Adelfo Fierro  [address] [facility] [address]             August 6, 2020         Dear Mr. Fierro,    Below are the results from your recent visit:    Resulted Orders   Lipid [place] FASTING   Result Value Ref Range    Cholesterol 203 (H) <=199 mg/dL    Triglycerides 79 <=149 mg/dL    HDL Cholesterol 61 >=40 mg/dL    LDL Calculated 126 <=129 mg/dL    Patient Fasting > 8hrs? Yes    Comprehensive Metabolic Panel   Result Value Ref Range    Sodium 132 (L) 136 - 145 mmol/L    Potassium 4.8 3.5 - 5.0 mmol/L    Chloride 96 (L) 98 - 107 mmol/L    CO2 25 22 - 31 mmol/L    Anion Gap, Calculation 11 5 - 18 mmol/L    Glucose 94 70 - 125 mg/dL    BUN 14 8 - 22 mg/dL    Creatinine 1.00 0.70 - 1.30 mg/dL    GFR MDRD Af Amer >60 >60 mL/min/1.73m2    GFR MDRD Non Af Amer >60 >60 mL/min/1.73m2    Bilirubin, Total 0.6 0.0 - 1.0 mg/dL    Calcium 10.1 8.5 - 10.5 mg/dL    Protein, Total 7.2 6.0 - 8.0 g/dL    Albumin 4.4 3.5 - 5.0 g/dL    Alkaline Phosphatase 83 45 - 120 U/L    AST 25 0 - 40 U/L    ALT 34 0 - 45 U/L    Narrative    Fasting Glucose reference range is 70-99 mg/dL per  American Diabetes Association (ADA) guidelines.   PSA, Annual Screen (Prostatic-Specific Antigen)   Result Value Ref Range    PSA 0.6 0.0 - 3.5 ng/mL    Narrative    Method is Abbott Prostate-Specific Antigen (PSA)  Standard-WHO 1st International (90:10)       Rey Emanuel:   Your baseline PSA is NORMAL.   We will recheck this at your next yearly physical.   The sodium continues to run slightly low (one point lower than last time).  As we said last time this is likely secondary to your BP med.  If it were to run any lower then we would want to switch up your BP med.  I would suggest a recheck or your sodium in 6 months.   The cholesterol panel is excellent.   The remaining labs are NORMAL.      Please call with questions or contact us using [employer].    Sincerely,        Electronically signed by Graeme Arriola MD

## 2021-06-20 NOTE — LETTER
Letter by Jacqueline Skaggs LPN at      Author: Jacqueline Skaggs LPN Service: -- Author Type: --    Filed:  Encounter Date: 8/24/2020 Status: (Other)         Adelfo AUSTYN Vadim  [address] [place] [phone]             August 24, 2020         Dear Mr. Fierro,    We have received a referral for you to have a consult with one of our sleep providers at [facility].  We are currently not having patients come into the clinic but are happy to off you a video or telephone consult if you are interested in that option.  The consult consists of talking with the provider about your sleep concerns and than deciding what the best option of care will be from there.  If this is something you would like to get scheduled, please call us directly at [phone] and we will get you scheduled as soon as possible.        Thank you,          Electronically signed by Jacqueline Skaggs LPN

## 2021-06-22 NOTE — PROGRESS NOTES
ASSESSMENT:  1. Acute bilateral low back pain with right-sided sciatica     2. Allergic rhinitis  fluticasone (FLONASE) 50 mcg/actuation nasal spray   3. Essential hypertension  lisinopril-hydrochlorothiazide (PRINZIDE,ZESTORETIC) 20-25 mg per tablet    Basic Metabolic Panel       PLAN:  Patient with on and off low back pain with sciatica.  Has tried no home treatments.  Discussed use of ibuprofen, ice, stretching.  If not improving he can let us know and we can refer on to formal physical therapy.    He feels provided today for Flonase which works well for allergies.  Also given refills for blood pressure medication.  Recheck labs today related to this medication and follow for results.  Blood pressure looks well today in clinic, we will can continue at his current dose.  No problem-specific Assessment & Plan notes found for this encounter.      There are no Patient Instructions on file for this visit.    Orders Placed This Encounter   Procedures     Basic Metabolic Panel     Medications Discontinued During This Encounter   Medication Reason     fluticasone (FLONASE) 50 mcg/actuation nasal spray Reorder     lisinopril-hydrochlorothiazide (PRINZIDE,ZESTORETIC) 20-25 mg per tablet Reorder     hydroCHLOROthiazide (HYDRODIURIL) 25 MG tablet Formulary change       No Follow-up on file.    CHIEF COMPLAINT:  Chief Complaint   Patient presents with     Medication Management     Medication Refill       HISTORY OF PRESENT ILLNESS:  Adelfo is a 48 y.o. male patient check.  Patient has had a history of hypertension currently on lisinopril hydrochlorothiazide.  This is working well to control his blood pressure, no side effects.  Blood pressure looks well today in clinic.  He also has ongoing allergy symptoms, Flonase works well to control these and he would like a refill today.  Wondering if he can use this consistently through the summer and winter.  Patient also wants to discuss low back pain and pain that comes down the legs  at times.  Sitting typically aggravates these symptoms and sometimes certain motions twisting or bending at the waist.  No weakness, no bladder or bowel dysfunction.  It occurs on and off, depending on his activity level.  He has not tried anything so far to help.    REVIEW OF SYSTEMS:      Pertinent items are noted in HPI.  All other systems are negative  PFSH:  Reviewed, no changes      TOBACCO USE:  Social History     Tobacco Use   Smoking Status Former Smoker   Smokeless Tobacco Current User     Types: Chew       VITALS:  Vitals:    11/30/18 1500   BP: 137/87   Patient Site: Left Arm   Patient Position: Sitting   Cuff Size: Adult Regular   Pulse: 76   Resp: 16   Weight: 176 lb 6 oz (80 kg)     Wt Readings from Last 3 Encounters:   11/30/18 176 lb 6 oz (80 kg)   10/12/17 180 lb 9 oz (81.9 kg)   07/19/16 177 lb 4 oz (80.4 kg)       PHYSICAL EXAM:   /87 (Patient Site: Left Arm, Patient Position: Sitting, Cuff Size: Adult Regular)   Pulse 76   Resp 16   Wt 176 lb 6 oz (80 kg)   BMI 22.65 kg/m     General appearance: alert, appears stated age and cooperative  Ears: normal TM's and external ear canals both ears  Nose: Nares normal. Septum midline. Mucosa normal. No drainage or sinus tenderness.  Throat: lips, mucosa, and tongue normal; teeth and gums normal  Neck: no adenopathy, no carotid bruit, no JVD, supple, symmetrical, trachea midline and thyroid not enlarged, symmetric, no tenderness/mass/nodules  Lungs: clear to auscultation bilaterally  Heart: regular rate and rhythm, S1, S2 normal, no murmur, click, rub or gallop  Extremities: extremities normal, atraumatic, no cyanosis or edema, straight leg raise negative bilaterally. Slight pain over low back, tight paraspinals, slight SI joint tenderness.   Pulses: 2+ and symmetric    DATA REVIEWED:  Additional History from Old Records Summarized (2): 0  Decision to Obtain Records (1): 0  Radiology Tests Summarized or Ordered (1): 0  Labs Reviewed or Ordered  (1): 1  Medicine Test Summarized or Ordered (1): 0  Independent Review of EKG or X-RAY(2 each): 0    The visit lasted a total of 30 minutes face to face with the patient. Over 50% of the time was spent counseling and educating the patient about plan of care.    MEDICATIONS:  Current Outpatient Medications   Medication Sig Dispense Refill     blood pressure monitor Kit Use 1 Device As Directed daily. 1 each 0     fluticasone (FLONASE) 50 mcg/actuation nasal spray 1 spray into each nostril daily. 16 g 5     lisinopril-hydrochlorothiazide (PRINZIDE,ZESTORETIC) 20-25 mg per tablet Take 1 tablet by mouth daily. 90 tablet 3     No current facility-administered medications for this visit.        This note has been dictated using voice recognition software. Any grammatical or context distortions are unintentional and inherent to the software

## 2021-06-29 NOTE — PROGRESS NOTES
Progress Notes by Graeme Arriola MD at 8/5/2020  9:40 AM     Author: Graeme Arriola MD Service: -- Author Type: Physician    Filed: 8/5/2020 10:13 AM Encounter Date: 8/5/2020 Status: Signed    : Graeme Arriola MD (Physician)       MALE PREVENTATIVE EXAM    Assessment and Plan:       1. Screen for colon cancer    - Ambulatory referral for Colonoscopy    2. Screening PSA (prostate specific antigen)    - PSA, Annual Screen (Prostatic-Specific Antigen)    3. Health care maintenance    - Lipid [place] FASTING  - Comprehensive Metabolic Panel    4. Essential hypertension  CONTROLLED    5. Nicotine abuse  CHEWS NICOTINE    6. Sleep concern  NEEDS EVAL  - Ambulatory referral to Sleep Medicine      PLAN:     Check with insurance on Shingrix coverage (for Shingles prevention)    Schedule colonoscopy    Fasting labs    Referral for a sleep study    I have counseled the patient for tobacco cessation and the follow up will occur  at the next visit.     Next follow up:  No follow-ups on file.    Immunization Review  Adult Imm Review: No immunizations due today  Appropriate consumption of alcohol advice given as per AVS. and CHEWS A CAN A WEEK;  BEER A CASE A WEEK    I discussed the following with the patient:   Adult Healthy Living: Importance of regular exercise  Healthy nutrition    I have had an Advance Directives discussion with the patient.    Subjective:   Chief Complaint: Adelfo Fierro is an 50 y.o. male here for a preventative health visit.     HPI:  NA    Healthy Habits  Are you taking a daily aspirin? No  Do you typically exercising at least 40 min, 3-4 times per week?  Yes  Do you usually eat at least 4 servings of fruit and vegetables a day, include whole grains and fiber and avoid regularly eating high fat foods? NO  Have you had an eye exam in the past two years? Yes  Do you see a dentist twice per year? Yes  Do you have any concerns regarding sleep? No; awakes to void once a night  "but then awakes and is in and out of sleep (anxious).  Going on for about 6 months.  Wife says he snores and says he stops breathing at times.     Safety Screen  If you own firearms, are they secured in a locked gun cabinet or with trigger locks? Yes  Do you feel you are safe where you are living?: Yes (8/5/2020  9:40 AM)  Do you feel you are safe in your relationship(s)?: Yes (8/5/2020  9:40 AM)      Review of Systems:   Winded with stairs but no chest pressure or heaviness.  Pain in the right hip area following some lifting on the job.pain /swelling in the muscles/joints;  Muscle in legs feel tight and stiff,  No back pain except for one day.   Awakens early; has worry and anxiety.     Please see above.  The rest of the review of systems are negative for all systems.     Cancer Screening     Patient has no health maintenance due at this time          Patient Care Team:  Graeme Arriola MD as PCP - General (Family Medicine)  Graeme Arriola MD as Assigned PCP        History     Reviewed By Date/Time Sections Reviewed    Debbie Rincon LPN 8/5/2020  9:38 AM Tobacco            Objective:   Vital Signs:   Visit Vitals  /80 (Patient Site: Left Arm, Patient Position: Sitting, Cuff Size: Adult Large)   Pulse 92   Temp 97.7  F (36.5  C) (Oral)   Resp 16   Ht 6' 1.25\" (1.861 m)   Wt 173 lb 6.4 oz (78.7 kg)   BMI 22.72 kg/m         PHYSICAL EXAM  /80 (Patient Site: Left Arm, Patient Position: Sitting, Cuff Size: Adult Large)   Pulse 92   Temp 97.7  F (36.5  C) (Oral)   Resp 16   Ht 6' 1.25\" (1.861 m)   Wt 173 lb 6.4 oz (78.7 kg)   BMI 22.72 kg/m      General Appearance:    Alert, cooperative, no distress, appears stated age   Head:    Normocephalic, without obvious abnormality, atraumatic   Eyes:    PERRL, conjunctiva/corneas clear, EOM's intact, fundi     benign, both eyes        Ears:    Normal TM's and external ear canals, both ears   Nose:   Nares normal, septum midline, mucosa " normal, no drainage    or sinus tenderness   Throat:   Lips, mucosa, and tongue normal; teeth and gums normal   Neck:   Supple, symmetrical, trachea midline, no adenopathy;        thyroid:  No enlargement/tenderness/nodules; no carotid    bruit or JVD   Back:     Symmetric, no curvature, ROM normal, no CVA tenderness   Lungs:     Clear to auscultation bilaterally, respirations unlabored   Chest wall:    No tenderness or deformity   Heart:    Regular rate and rhythm, S1 and S2 normal, no murmur, rub    or gallop   Abdomen:     Soft, non-tender, bowel sounds active all four quadrants,     no masses, no organomegaly   Genitalia:   not examined   Rectal:   exam declined after discussion   Extremities:   Extremities normal, atraumatic, no cyanosis or edema       Skin:   Skin color, texture, turgor normal, no rashes or lesions   Lymph nodes:   Cervical, supraclavicular, and axillary nodes normal   Neurologic:   CNII-XII intact. Normal strength, sensation and reflexes       throughout       The ASCVD Risk score ([place] DC Jr., et al., 2013) failed to calculate for the following reasons:    Cannot find a previous HDL lab    Cannot find a previous total cholesterol lab         Medication List          Accurate as of August 5, 2020 10:08 AM. If you have any questions, ask your nurse or doctor.            CONTINUE taking these medications    blood pressure monitor Kit  INSTRUCTIONS:  Use 1 Device As Directed daily.        fluticasone propionate 50 mcg/actuation nasal spray  Also known as:  FLONASE  INSTRUCTIONS:  1 spray into each nostril daily.        lisinopriL-hydrochlorothiazide 20-25 mg per tablet  Also known as:  PRINZIDE,ZESTORETIC  INSTRUCTIONS:  Take 1 tablet by mouth daily.  Doctor's comments:  Needs appt, labs and establish new provider before next refill               Additional Screenings Completed Today:

## 2021-09-27 DIAGNOSIS — I10 ESSENTIAL HYPERTENSION: ICD-10-CM

## 2021-09-28 RX ORDER — LISINOPRIL AND HYDROCHLOROTHIAZIDE 20; 25 MG/1; MG/1
1 TABLET ORAL DAILY
Qty: 90 TABLET | Refills: 0 | Status: SHIPPED | OUTPATIENT
Start: 2021-09-28 | End: 2021-12-20

## 2021-09-28 NOTE — TELEPHONE ENCOUNTER
A physical with fasting labs should be completed within the next 90 days and before further med refill.

## 2021-09-28 NOTE — TELEPHONE ENCOUNTER
"Routing refill request to provider for review/approval because:  Labs not current:  Multiple   Patient needs to be seen because it has been more than 1 year since last office visit.    Last Written Prescription Date:  3/7/21  Last Fill Quantity: 90,  # refills: 1   Last office visit provider:  8/5/20     Requested Prescriptions   Pending Prescriptions Disp Refills     lisinopril-hydrochlorothiazide (ZESTORETIC) 20-25 MG tablet 90 tablet 1     Sig: Take 1 tablet by mouth daily       Diuretics (Including Combos) Protocol Failed - 9/27/2021 11:04 AM        Failed - Blood pressure under 140/90 in past 12 months     BP Readings from Last 3 Encounters:   08/19/20 106/76   08/05/20 121/80   06/19/20 132/88                 Failed - Recent (12 mo) or future (30 days) visit within the authorizing provider's specialty     Patient has had an office visit with the authorizing provider or a provider within the authorizing providers department within the previous 12 mos or has a future within next 30 days. See \"Patient Info\" tab in inbasket, or \"Choose Columns\" in Meds & Orders section of the refill encounter.              Failed - Normal serum creatinine on file in past 12 months     Recent Labs   Lab Test 08/05/20  1013   CR 1.00              Failed - Normal serum potassium on file in past 12 months     Recent Labs   Lab Test 08/05/20  1013   POTASSIUM 4.8                    Failed - Normal serum sodium on file in past 12 months     Recent Labs   Lab Test 08/05/20  1013   *              Passed - Medication is active on med list        Passed - Patient is age 18 or older       ACE Inhibitors (Including Combos) Protocol Failed - 9/27/2021 11:04 AM        Failed - Blood pressure under 140/90 in past 12 months     BP Readings from Last 3 Encounters:   08/19/20 106/76   08/05/20 121/80   06/19/20 132/88                 Failed - Recent (12 mo) or future (30 days) visit within the authorizing provider's specialty     Patient has " "had an office visit with the authorizing provider or a provider within the authorizing providers department within the previous 12 mos or has a future within next 30 days. See \"Patient Info\" tab in inbasket, or \"Choose Columns\" in Meds & Orders section of the refill encounter.              Failed - Normal serum creatinine on file in past 12 months     Recent Labs   Lab Test 08/05/20  1013   CR 1.00       Ok to refill medication if creatinine is low          Failed - Normal serum potassium on file in past 12 months     Recent Labs   Lab Test 08/05/20  1013   POTASSIUM 4.8             Passed - Medication is active on med list        Passed - Patient is age 18 or older             Graeme Flores RN 09/28/21 10:14 AM  "

## 2021-12-20 ENCOUNTER — TELEPHONE (OUTPATIENT)
Dept: FAMILY MEDICINE | Facility: CLINIC | Age: 51
End: 2021-12-20

## 2021-12-20 ENCOUNTER — OFFICE VISIT (OUTPATIENT)
Dept: FAMILY MEDICINE | Facility: CLINIC | Age: 51
End: 2021-12-20
Payer: COMMERCIAL

## 2021-12-20 VITALS
DIASTOLIC BLOOD PRESSURE: 88 MMHG | SYSTOLIC BLOOD PRESSURE: 143 MMHG | HEART RATE: 79 BPM | HEIGHT: 73 IN | BODY MASS INDEX: 24.06 KG/M2 | WEIGHT: 181.5 LBS

## 2021-12-20 DIAGNOSIS — Z00.00 HEALTHCARE MAINTENANCE: ICD-10-CM

## 2021-12-20 DIAGNOSIS — Z23 NEED FOR PROPHYLACTIC VACCINATION AND INOCULATION AGAINST INFLUENZA: ICD-10-CM

## 2021-12-20 DIAGNOSIS — I10 ESSENTIAL HYPERTENSION: ICD-10-CM

## 2021-12-20 DIAGNOSIS — F41.9 ANXIETY: ICD-10-CM

## 2021-12-20 DIAGNOSIS — Z12.5 SCREENING PSA (PROSTATE SPECIFIC ANTIGEN): ICD-10-CM

## 2021-12-20 DIAGNOSIS — Z23 NEED FOR TDAP VACCINATION: ICD-10-CM

## 2021-12-20 DIAGNOSIS — Z72.0 NICOTINE ABUSE: ICD-10-CM

## 2021-12-20 LAB
ALBUMIN SERPL-MCNC: 4 G/DL (ref 3.5–5)
ALP SERPL-CCNC: 84 U/L (ref 45–120)
ALT SERPL W P-5'-P-CCNC: 29 U/L (ref 0–45)
ANION GAP SERPL CALCULATED.3IONS-SCNC: 12 MMOL/L (ref 5–18)
AST SERPL W P-5'-P-CCNC: 24 U/L (ref 0–40)
BILIRUB SERPL-MCNC: 0.3 MG/DL (ref 0–1)
BUN SERPL-MCNC: 10 MG/DL (ref 8–22)
CALCIUM SERPL-MCNC: 9.5 MG/DL (ref 8.5–10.5)
CHLORIDE BLD-SCNC: 95 MMOL/L (ref 98–107)
CHOLEST SERPL-MCNC: 194 MG/DL
CO2 SERPL-SCNC: 25 MMOL/L (ref 22–31)
CREAT SERPL-MCNC: 0.85 MG/DL (ref 0.7–1.3)
ERYTHROCYTE [DISTWIDTH] IN BLOOD BY AUTOMATED COUNT: 11.6 % (ref 10–15)
FASTING STATUS PATIENT QL REPORTED: YES
GFR SERPL CREATININE-BSD FRML MDRD: >90 ML/MIN/1.73M2
GLUCOSE BLD-MCNC: 103 MG/DL (ref 70–125)
HCT VFR BLD AUTO: 45.5 % (ref 40–53)
HDLC SERPL-MCNC: 62 MG/DL
HGB BLD-MCNC: 15.5 G/DL (ref 13.3–17.7)
LDLC SERPL CALC-MCNC: 105 MG/DL
MCH RBC QN AUTO: 30.9 PG (ref 26.5–33)
MCHC RBC AUTO-ENTMCNC: 34.1 G/DL (ref 31.5–36.5)
MCV RBC AUTO: 91 FL (ref 78–100)
PLATELET # BLD AUTO: 226 10E3/UL (ref 150–450)
POTASSIUM BLD-SCNC: 4.6 MMOL/L (ref 3.5–5)
PROT SERPL-MCNC: 6.9 G/DL (ref 6–8)
PSA SERPL-MCNC: 0.57 UG/L (ref 0–3.5)
RBC # BLD AUTO: 5.02 10E6/UL (ref 4.4–5.9)
SODIUM SERPL-SCNC: 132 MMOL/L (ref 136–145)
TRIGL SERPL-MCNC: 135 MG/DL
WBC # BLD AUTO: 3.6 10E3/UL (ref 4–11)

## 2021-12-20 PROCEDURE — G0103 PSA SCREENING: HCPCS | Performed by: FAMILY MEDICINE

## 2021-12-20 PROCEDURE — 99396 PREV VISIT EST AGE 40-64: CPT | Performed by: FAMILY MEDICINE

## 2021-12-20 PROCEDURE — 82306 VITAMIN D 25 HYDROXY: CPT | Performed by: FAMILY MEDICINE

## 2021-12-20 PROCEDURE — 85027 COMPLETE CBC AUTOMATED: CPT | Performed by: FAMILY MEDICINE

## 2021-12-20 PROCEDURE — 36415 COLL VENOUS BLD VENIPUNCTURE: CPT | Performed by: FAMILY MEDICINE

## 2021-12-20 PROCEDURE — 80053 COMPREHEN METABOLIC PANEL: CPT | Performed by: FAMILY MEDICINE

## 2021-12-20 PROCEDURE — 99213 OFFICE O/P EST LOW 20 MIN: CPT | Mod: 25 | Performed by: FAMILY MEDICINE

## 2021-12-20 PROCEDURE — 80061 LIPID PANEL: CPT | Performed by: FAMILY MEDICINE

## 2021-12-20 RX ORDER — FLUOXETINE 10 MG/1
10 CAPSULE ORAL DAILY
Qty: 30 CAPSULE | Refills: 1 | Status: SHIPPED | OUTPATIENT
Start: 2021-12-20 | End: 2022-01-04

## 2021-12-20 RX ORDER — LISINOPRIL AND HYDROCHLOROTHIAZIDE 20; 25 MG/1; MG/1
1 TABLET ORAL DAILY
Qty: 90 TABLET | Refills: 3 | Status: SHIPPED | OUTPATIENT
Start: 2021-12-20 | End: 2022-12-27

## 2021-12-20 ASSESSMENT — MIFFLIN-ST. JEOR: SCORE: 1736.12

## 2021-12-20 NOTE — PATIENT INSTRUCTIONS
Fasting labs    Colonoscopy due in April 2031    Trial of fluoxetine 10 mg daily    Follow up in 2-3 weeks    CUT OUT ALCOHOL OR DO NOT EXCEED A DRINK A DAY.    SET A NICOTINE QUIT DATE    Tetanus update (Tdap) and flu vaccine  in 1-2 weeks    Home Bps and then we will recheck the BP in a couple weeks when we have a follow up on the anxiety and new med (fluoxetine)

## 2021-12-20 NOTE — PROGRESS NOTES
SUBJECTIVE:   CC: Adelfo Fierro is an 51 year old male who presents for preventative health visit.       Patient has been advised of split billing requirements and indicates understanding: Yes  Healthy Habits:     Getting at least 3 servings of Calcium per day:  NO    Bi-annual eye exam:  Yes    Dental care twice a year:  NO    Sleep apnea or symptoms of sleep apnea:  Excessive snoring    Diet:  Regular (no restrictions)    Frequency of exercise:  None    Taking medications regularly:  Yes    Medication side effects:  None    PHQ-2 Total Score: 0    Additional concerns today:  No        Today's PHQ-2 Score:   PHQ-2 ( 1999 Pfizer) 12/20/2021   Q1: Little interest or pleasure in doing things 0   Q2: Feeling down, depressed or hopeless 0   PHQ-2 Score 0   Q1: Little interest or pleasure in doing things Not at all   Q2: Feeling down, depressed or hopeless Not at all   PHQ-2 Score 0       Abuse: Current or Past(Physical, Sexual or Emotional)- No  Do you feel safe in your environment? Yes        Social History     Tobacco Use     Smoking status: Former Smoker     Smokeless tobacco: Current User     Types: Chew, Chew   Substance Use Topics     Alcohol use: Yes     Alcohol/week: 14.0 standard drinks     Quit smoking years ago.  Still chews tobacco.          Alcohol Use 12/20/2021   Prescreen: >3 drinks/day or >7 drinks/week? Yes   AUDIT SCORE  6       Last PSA:   Prostate Specific Antigen Screen   Date Value Ref Range Status   08/05/2020 0.6 0.0 - 3.5 ng/mL Final           Review of Systems  CONSTITUTIONAL: NEGATIVE for fever, chills, change in weight  INTEGUMENTARY/SKIN: NEGATIVE for worrisome rashes, moles or lesions  EYES: NEGATIVE for vision changes or irritation  ENT: NEGATIVE for ear, mouth and throat problems  RESP: NEGATIVE for significant cough or SOB  CV: NEGATIVE for chest pain, palpitations or peripheral edema  GI: NEGATIVE for nausea, abdominal pain, heartburn, or change in bowel habits   male: negative for  "dysuria, hematuria, decreased urinary stream, erectile dysfunction, urethral discharge  MUSCULOSKELETAL: NEGATIVE for significant arthralgias or myalgia  NEURO: NEGATIVE for weakness, dizziness or paresthesias  PSYCHIATRIC: NEGATIVE for changes in mood or affect    A lot of anxiety in the past year    OBJECTIVE:   BP (!) 143/88   Pulse 79   Ht 1.861 m (6' 1.25\")   Wt 82.3 kg (181 lb 8 oz)   BMI 23.78 kg/m      Physical Exam  GENERAL: healthy, alert and no distress  EYES: Eyes grossly normal to inspection, PERRL and conjunctivae and sclerae normal  HENT: ear canals and TM's normal, nose and mouth without ulcers or lesions  NECK: no adenopathy, no asymmetry, masses, or scars and thyroid normal to palpation  RESP: lungs clear to auscultation - no rales, rhonchi or wheezes  CV: regular rate and rhythm, normal S1 S2, no S3 or S4, no murmur, click or rub, no peripheral edema and peripheral pulses strong  ABDOMEN: soft, nontender, no hepatosplenomegaly, no masses and bowel sounds normal   (male): exam declined in lieu of PSA  MS: no gross musculoskeletal defects noted, no edema  SKIN: no suspicious lesions or rashes  NEURO: Normal strength and tone, mentation intact and speech normal  PSYCH: mentation appears normal, affect normal/bright    Prostate Specific Antigen Screen   Date Value Ref Range Status   08/05/2020 0.6 0.0 - 3.5 ng/mL Final         ASSESSMENT/PLAN:     Encounter Diagnoses   Name Primary?     Healthcare maintenance      Need for prophylactic vaccination and inoculation against influenza      Essential hypertension, well controlled      Screening PSA (prostate specific antigen)      Anxiety,  New trial of low dose fluoxetine Yes     Need for Tdap vaccination         Nicotine abuse, chew  PLAN:        Fasting labs    Colonoscopy due in April 2031    Trial of fluoxetine 10 mg daily    Follow up in 2-3 weeks    CUT OUT ALCOHOL OR DO NOT EXCEED A DRINK A DAY.    SET A NICOTINE QUIT DATE. Discussed also " "trying a nicotine losenge or nicotine gum.      Tetanus update (Tdap) and flu vaccine  in 1-2 weeks                Patient has been advised of split billing requirements and indicates understanding: Yes  COUNSELING:   Reviewed preventive health counseling, as reflected in patient instructions       Regular exercise       Healthy diet/nutrition       Alcohol Use        Colon cancer screening       Prostate cancer screening    Estimated body mass index is 23.78 kg/m  as calculated from the following:    Height as of this encounter: 1.861 m (6' 1.25\").    Weight as of this encounter: 82.3 kg (181 lb 8 oz).         He reports that he has quit smoking. His smokeless tobacco use includes chew and chew.        Graeme Arriola MD  [facility]  "

## 2021-12-20 NOTE — TELEPHONE ENCOUNTER
Incoming call from pt wanting to let you know he did not get flu shot today. He said he will due flu and Tdap at his next appointment on 1/4

## 2021-12-20 NOTE — LETTER
December 21, 2021      Adelfo Fierro  [address] [place] [id number]        Dear ,  We are writing to inform you of your test results.    Rey Emanuel:  The sodium remains very slightly below normal but stable.  This is most likely due to your lisinopril-hydrochlorothiazide BP med.  It's ok as long as it doesn't drop any further.    Your Vit D level is mildly low.  I would recommend that you take Vit D3 4000 units daily for 2 months and then 2000 units daily thereafter.    Your PSA is normal and stable and should be rechecked in one year.  The slightly low white count is ok.  The cholesterol panel is good.  The remaining labs are normal.    Resulted Orders   Comprehensive metabolic panel (BMP + Alb, Alk Phos, ALT, AST, Total. Bili, TP)   Result Value Ref Range    Sodium 132 (L) 136 - 145 mmol/L    Potassium 4.6 3.5 - 5.0 mmol/L    Chloride 95 (L) 98 - 107 mmol/L    Carbon Dioxide (CO2) 25 22 - 31 mmol/L    Anion Gap 12 5 - 18 mmol/L    Urea Nitrogen 10 8 - 22 mg/dL    Creatinine 0.85 0.70 - 1.30 mg/dL    Calcium 9.5 8.5 - 10.5 mg/dL    Glucose 103 70 - 125 mg/dL    Alkaline Phosphatase 84 45 - 120 U/L    AST 24 0 - 40 U/L    ALT 29 0 - 45 U/L    Protein Total 6.9 6.0 - 8.0 g/dL    Albumin 4.0 3.5 - 5.0 g/dL    Bilirubin Total 0.3 0.0 - 1.0 mg/dL    GFR Estimate >90 >60 mL/min/1.73m2      Comment:      As of July 11, 2021, eGFR is calculated by the CKD-EPI creatinine equation, without race adjustment. eGFR can be influenced by muscle mass, exercise, and diet. The reported eGFR is an estimation only and is only applicable if the renal function is stable.   CBC with platelets   Result Value Ref Range    WBC Count 3.6 (L) 4.0 - 11.0 10e3/uL    RBC Count 5.02 4.40 - 5.90 10e6/uL    Hemoglobin 15.5 13.3 - 17.7 g/dL    Hematocrit 45.5 40.0 - 53.0 %    MCV 91 78 - 100 fL    MCH 30.9 26.5 - 33.0 pg    MCHC 34.1 31.5 - 36.5 g/dL    RDW 11.6 10.0 - 15.0 %    Platelet Count 226 150 - 450 10e3/uL   Lipid panel reflex to  direct LDL Fasting   Result Value Ref Range    Cholesterol 194 <=199 mg/dL    Triglycerides 135 <=149 mg/dL    Direct Measure HDL 62 >=40 mg/dL      Comment:      HDL Cholesterol Reference Range:     0-2 years:   No reference ranges established for patients under 2 years old  at [facility] for lipid analytes.    2-8 years:  Greater than 45 mg/dL     18 years and older:   Female: Greater than or equal to 50 mg/dL   Male:   Greater than or equal to 40 mg/dL    LDL Cholesterol Calculated 105 <=129 mg/dL    Patient Fasting > 8hrs? Yes    Vitamin D Deficiency   Result Value Ref Range    Vitamin D, Total (25-Hydroxy) 18 (L) 30 - 80 ug/L    Narrative    Deficiency <10.0 ug/L  Insufficiency 10.0-29.9 ug/L  Sufficiency 30.0-80.0 ug/L  Toxicity (possible) >100.0 ug/L    PSA, screen   Result Value Ref Range    Prostate Specific Antigen Screen 0.57 0.00 - 3.50 ug/L    Narrative    Assay Method is Abbott Prostate-Specific Antigen (PSA)  Standard-WHO 1st International (90:10)       If you have any questions or concerns, please call the clinic at the number listed above.       Sincerely,      Graeme Arriola MD

## 2021-12-21 LAB — DEPRECATED CALCIDIOL+CALCIFEROL SERPL-MC: 18 UG/L (ref 30–80)

## 2022-01-04 ENCOUNTER — OFFICE VISIT (OUTPATIENT)
Dept: FAMILY MEDICINE | Facility: CLINIC | Age: 52
End: 2022-01-04
Payer: COMMERCIAL

## 2022-01-04 VITALS
HEART RATE: 105 BPM | HEIGHT: 73 IN | DIASTOLIC BLOOD PRESSURE: 74 MMHG | TEMPERATURE: 97.1 F | SYSTOLIC BLOOD PRESSURE: 117 MMHG | WEIGHT: 178 LBS | BODY MASS INDEX: 23.59 KG/M2

## 2022-01-04 DIAGNOSIS — Z23 NEED FOR INFLUENZA VACCINATION: ICD-10-CM

## 2022-01-04 DIAGNOSIS — Z72.0 NICOTINE ABUSE: Primary | ICD-10-CM

## 2022-01-04 DIAGNOSIS — Z23 NEED FOR TETANUS BOOSTER: ICD-10-CM

## 2022-01-04 PROCEDURE — 90682 RIV4 VACC RECOMBINANT DNA IM: CPT | Performed by: FAMILY MEDICINE

## 2022-01-04 PROCEDURE — 90472 IMMUNIZATION ADMIN EACH ADD: CPT | Performed by: FAMILY MEDICINE

## 2022-01-04 PROCEDURE — 99213 OFFICE O/P EST LOW 20 MIN: CPT | Mod: 25 | Performed by: FAMILY MEDICINE

## 2022-01-04 PROCEDURE — 90715 TDAP VACCINE 7 YRS/> IM: CPT | Performed by: FAMILY MEDICINE

## 2022-01-04 PROCEDURE — 90471 IMMUNIZATION ADMIN: CPT | Performed by: FAMILY MEDICINE

## 2022-01-04 RX ORDER — BUPROPION HYDROCHLORIDE 150 MG/1
TABLET ORAL
Qty: 60 TABLET | Refills: 3 | Status: SHIPPED | OUTPATIENT
Start: 2022-01-04 | End: 2023-05-09

## 2022-01-04 ASSESSMENT — ANXIETY QUESTIONNAIRES
5. BEING SO RESTLESS THAT IT IS HARD TO SIT STILL: NOT AT ALL
1. FEELING NERVOUS, ANXIOUS, OR ON EDGE: SEVERAL DAYS
2. NOT BEING ABLE TO STOP OR CONTROL WORRYING: NOT AT ALL
7. FEELING AFRAID AS IF SOMETHING AWFUL MIGHT HAPPEN: SEVERAL DAYS
4. TROUBLE RELAXING: SEVERAL DAYS
6. BECOMING EASILY ANNOYED OR IRRITABLE: NOT AT ALL
3. WORRYING TOO MUCH ABOUT DIFFERENT THINGS: SEVERAL DAYS
GAD7 TOTAL SCORE: 4
IF YOU CHECKED OFF ANY PROBLEMS ON THIS QUESTIONNAIRE, HOW DIFFICULT HAVE THESE PROBLEMS MADE IT FOR YOU TO DO YOUR WORK, TAKE CARE OF THINGS AT HOME, OR GET ALONG WITH OTHER PEOPLE: NOT DIFFICULT AT ALL

## 2022-01-04 ASSESSMENT — MIFFLIN-ST. JEOR: SCORE: 1720.24

## 2022-01-04 ASSESSMENT — PATIENT HEALTH QUESTIONNAIRE - PHQ9: SUM OF ALL RESPONSES TO PHQ QUESTIONS 1-9: 1

## 2022-01-04 NOTE — PROGRESS NOTES
"DIAGNOSIS:  Encounter Diagnoses   Name Primary?     Nicotine abuse Yes     Need for tetanus booster      Need for influenza vaccination         PLAN:     Update Tdap    Flu vaccine    Fluoxetine has been discontinued    Trial of Wellbutrin  orally daily for 3-7 days and then increase to twice daily.    Follow in 6 weeks           HPI:  Had  To stop the fluoxetine due to headaches and feeling foggy.     Has tried the patch in the past.  Uses about two cans of chew in a week.  Doesn't get depressed.  Worries about the unknown.  As getting older gets more anxious.  Sleeping fine.  Leaving for [place] on vacation.            Current Outpatient Medications   Medication     blood pressure monitor Kit     buPROPion (WELLBUTRIN XL) 150 MG 24 hr tablet     fluticasone propionate (FLONASE) 50 mcg/actuation nasal spray     lisinopril-hydrochlorothiazide (ZESTORETIC) 20-25 MG tablet     No current facility-administered medications for this visit.       Pmh: reviewed  Psh: reviewed  Allergy:  reviewed      EXAM:    /74   Pulse 105   Temp 97.1  F (36.2  C) (Oral)   Ht 1.861 m (6' 1.25\")   Wt 80.7 kg (178 lb)   BMI 23.32 kg/m    GEN:   ALERT, NAD, ORIENTED TIMES THREE  LUNGS: CTA  COR: RRR WITHOUT MURMUR  EXT: WITHOUT EDEMA/SWELLING      "

## 2022-01-04 NOTE — PATIENT INSTRUCTIONS
Update Tdap    Flu vaccine    Fluoxetine has been discontinued    Trial of Wellbutrin  orally daily for 3-7 days and then increase to twice daily.    Follow up in 6 weeks

## 2022-01-05 ASSESSMENT — ANXIETY QUESTIONNAIRES: GAD7 TOTAL SCORE: 4

## 2022-12-26 DIAGNOSIS — I10 ESSENTIAL HYPERTENSION: ICD-10-CM

## 2022-12-27 RX ORDER — LISINOPRIL AND HYDROCHLOROTHIAZIDE 20; 25 MG/1; MG/1
1 TABLET ORAL DAILY
Qty: 90 TABLET | Refills: 0 | Status: SHIPPED | OUTPATIENT
Start: 2022-12-27 | End: 2023-04-18

## 2022-12-27 NOTE — TELEPHONE ENCOUNTER
"Routing refill request to provider for review/approval because:  Labs not current:  Creatinine, potassium    Last Written Prescription Date:  12/20/21  Last Fill Quantity: 90,  # refills: 3   Last office visit provider:  1/4/22     Requested Prescriptions   Pending Prescriptions Disp Refills     lisinopril-hydrochlorothiazide (ZESTORETIC) 20-25 MG tablet [Pharmacy Med Name: Lisinopril-hydroCHLOROthiazide Oral Tablet 20-25 MG] 90 tablet 0     Sig: Take 1 tablet by mouth daily       Diuretics (Including Combos) Protocol Failed - 12/26/2022  2:00 AM        Failed - Normal serum creatinine on file in past 12 months     Recent Labs   Lab Test 12/20/21  0828   CR 0.85              Failed - Normal serum potassium on file in past 12 months     Recent Labs   Lab Test 12/20/21  0828   POTASSIUM 4.6                    Failed - Normal serum sodium on file in past 12 months     Recent Labs   Lab Test 12/20/21  0828   *              Passed - Blood pressure under 140/90 in past 12 months     BP Readings from Last 3 Encounters:   01/04/22 117/74   12/20/21 (!) 143/88   08/19/20 106/76                 Passed - Recent (12 mo) or future (30 days) visit within the authorizing provider's specialty     Patient has had an office visit with the authorizing provider or a provider within the authorizing providers department within the previous 12 mos or has a future within next 30 days. See \"Patient Info\" tab in inbasket, or \"Choose Columns\" in Meds & Orders section of the refill encounter.              Passed - Medication is active on med list        Passed - Patient is age 18 or older       ACE Inhibitors (Including Combos) Protocol Failed - 12/26/2022  2:00 AM        Failed - Normal serum creatinine on file in past 12 months     Recent Labs   Lab Test 12/20/21  0828   CR 0.85       Ok to refill medication if creatinine is low          Failed - Normal serum potassium on file in past 12 months     Recent Labs   Lab Test 12/20/21  0828 " "  POTASSIUM 4.6             Passed - Blood pressure under 140/90 in past 12 months     BP Readings from Last 3 Encounters:   01/04/22 117/74   12/20/21 (!) 143/88   08/19/20 106/76                 Passed - Recent (12 mo) or future (30 days) visit within the authorizing provider's specialty     Patient has had an office visit with the authorizing provider or a provider within the authorizing providers department within the previous 12 mos or has a future within next 30 days. See \"Patient Info\" tab in inbasket, or \"Choose Columns\" in Meds & Orders section of the refill encounter.              Passed - Medication is active on med list        Passed - Patient is age 18 or older             Ricky Mckeon RN 12/27/22 9:49 AM  "

## 2022-12-29 NOTE — TELEPHONE ENCOUNTER
Called and informed patient of message below. He will call back when he gets home to schedule an appt.

## 2023-04-18 DIAGNOSIS — I10 ESSENTIAL HYPERTENSION: ICD-10-CM

## 2023-04-18 RX ORDER — LISINOPRIL AND HYDROCHLOROTHIAZIDE 20; 25 MG/1; MG/1
1 TABLET ORAL DAILY
Qty: 30 TABLET | Refills: 0 | Status: SHIPPED | OUTPATIENT
Start: 2023-04-18 | End: 2023-05-09

## 2023-04-18 NOTE — TELEPHONE ENCOUNTER
"Routing refill request to provider for review/approval because:  Labs out of range:  NA  Labs not current:  CR, NA  Patient needs to be seen because it has been more than 1 year since last office visit.  No BP in the past 12 months on file.    Last Written Prescription Date:  12/27/2022  Last Fill Quantity: 90,  # refills: 0   Last office visit provider:  1/4/2022     Requested Prescriptions   Pending Prescriptions Disp Refills     lisinopril-hydrochlorothiazide (ZESTORETIC) 20-25 MG tablet 90 tablet 0     Sig: Take 1 tablet by mouth daily       Diuretics (Including Combos) Protocol Failed - 4/18/2023  3:46 PM        Failed - Blood pressure under 140/90 in past 12 months     BP Readings from Last 3 Encounters:   01/04/22 117/74   12/20/21 (!) 143/88   08/19/20 106/76                 Failed - Recent (12 mo) or future (30 days) visit within the authorizing provider's specialty     Patient has had an office visit with the authorizing provider or a provider within the authorizing providers department within the previous 12 mos or has a future within next 30 days. See \"Patient Info\" tab in inbasket, or \"Choose Columns\" in Meds & Orders section of the refill encounter.              Failed - Normal serum creatinine on file in past 12 months     Recent Labs   Lab Test 12/20/21  0828   CR 0.85              Failed - Normal serum potassium on file in past 12 months     Recent Labs   Lab Test 12/20/21  0828   POTASSIUM 4.6                    Failed - Normal serum sodium on file in past 12 months     Recent Labs   Lab Test 12/20/21  0828   *              Passed - Medication is active on med list        Passed - Patient is age 18 or older       ACE Inhibitors (Including Combos) Protocol Failed - 4/18/2023  3:46 PM        Failed - Blood pressure under 140/90 in past 12 months     BP Readings from Last 3 Encounters:   01/04/22 117/74   12/20/21 (!) 143/88   08/19/20 106/76                 Failed - Recent (12 mo) or future (30 " "days) visit within the authorizing provider's specialty     Patient has had an office visit with the authorizing provider or a provider within the authorizing providers department within the previous 12 mos or has a future within next 30 days. See \"Patient Info\" tab in inbasket, or \"Choose Columns\" in Meds & Orders section of the refill encounter.              Failed - Normal serum creatinine on file in past 12 months     Recent Labs   Lab Test 12/20/21  0828   CR 0.85       Ok to refill medication if creatinine is low          Failed - Normal serum potassium on file in past 12 months     Recent Labs   Lab Test 12/20/21  0828   POTASSIUM 4.6             Passed - Medication is active on med list        Passed - Patient is age 18 or older             Gretta Payne RN 04/18/23 3:50 PM  "

## 2023-05-09 ENCOUNTER — OFFICE VISIT (OUTPATIENT)
Dept: FAMILY MEDICINE | Facility: CLINIC | Age: 53
End: 2023-05-09
Payer: COMMERCIAL

## 2023-05-09 VITALS
SYSTOLIC BLOOD PRESSURE: 121 MMHG | OXYGEN SATURATION: 100 % | HEART RATE: 79 BPM | WEIGHT: 182 LBS | RESPIRATION RATE: 16 BRPM | HEIGHT: 73 IN | TEMPERATURE: 98.3 F | DIASTOLIC BLOOD PRESSURE: 78 MMHG | BODY MASS INDEX: 24.12 KG/M2

## 2023-05-09 DIAGNOSIS — H61.22 IMPACTED CERUMEN OF LEFT EAR: ICD-10-CM

## 2023-05-09 DIAGNOSIS — Z00.00 HEALTHCARE MAINTENANCE: ICD-10-CM

## 2023-05-09 DIAGNOSIS — J30.9 ALLERGIC RHINITIS, UNSPECIFIED SEASONALITY, UNSPECIFIED TRIGGER: ICD-10-CM

## 2023-05-09 DIAGNOSIS — Z72.0 NICOTINE ABUSE: ICD-10-CM

## 2023-05-09 DIAGNOSIS — Z12.5 SCREENING PSA (PROSTATE SPECIFIC ANTIGEN): ICD-10-CM

## 2023-05-09 DIAGNOSIS — K13.70 ORAL MUCOSAL LESION: ICD-10-CM

## 2023-05-09 DIAGNOSIS — I10 ESSENTIAL HYPERTENSION: ICD-10-CM

## 2023-05-09 LAB
ALBUMIN SERPL BCG-MCNC: 4.7 G/DL (ref 3.5–5.2)
ALP SERPL-CCNC: 82 U/L (ref 40–129)
ALT SERPL W P-5'-P-CCNC: 24 U/L (ref 10–50)
ANION GAP SERPL CALCULATED.3IONS-SCNC: 13 MMOL/L (ref 7–15)
AST SERPL W P-5'-P-CCNC: 20 U/L (ref 10–50)
BILIRUB SERPL-MCNC: 0.3 MG/DL
BUN SERPL-MCNC: 19.6 MG/DL (ref 6–20)
CALCIUM SERPL-MCNC: 9.5 MG/DL (ref 8.6–10)
CHLORIDE SERPL-SCNC: 100 MMOL/L (ref 98–107)
CHOLEST SERPL-MCNC: 174 MG/DL
CREAT SERPL-MCNC: 1.07 MG/DL (ref 0.67–1.17)
DEPRECATED HCO3 PLAS-SCNC: 23 MMOL/L (ref 22–29)
ERYTHROCYTE [DISTWIDTH] IN BLOOD BY AUTOMATED COUNT: 11.7 % (ref 10–15)
GFR SERPL CREATININE-BSD FRML MDRD: 83 ML/MIN/1.73M2
GLUCOSE SERPL-MCNC: 99 MG/DL (ref 70–99)
HCT VFR BLD AUTO: 43.3 % (ref 40–53)
HDLC SERPL-MCNC: 42 MG/DL
HGB BLD-MCNC: 15.3 G/DL (ref 13.3–17.7)
LDLC SERPL CALC-MCNC: 117 MG/DL
MCH RBC QN AUTO: 31.4 PG (ref 26.5–33)
MCHC RBC AUTO-ENTMCNC: 35.3 G/DL (ref 31.5–36.5)
MCV RBC AUTO: 89 FL (ref 78–100)
NONHDLC SERPL-MCNC: 132 MG/DL
PLATELET # BLD AUTO: 243 10E3/UL (ref 150–450)
POTASSIUM SERPL-SCNC: 4.5 MMOL/L (ref 3.4–5.3)
PROT SERPL-MCNC: 7.4 G/DL (ref 6.4–8.3)
PSA SERPL DL<=0.01 NG/ML-MCNC: 0.93 NG/ML (ref 0–3.5)
RBC # BLD AUTO: 4.88 10E6/UL (ref 4.4–5.9)
SODIUM SERPL-SCNC: 136 MMOL/L (ref 136–145)
TRIGL SERPL-MCNC: 76 MG/DL
WBC # BLD AUTO: 4.8 10E3/UL (ref 4–11)

## 2023-05-09 PROCEDURE — 99396 PREV VISIT EST AGE 40-64: CPT | Performed by: FAMILY MEDICINE

## 2023-05-09 PROCEDURE — 85027 COMPLETE CBC AUTOMATED: CPT | Performed by: FAMILY MEDICINE

## 2023-05-09 PROCEDURE — 36415 COLL VENOUS BLD VENIPUNCTURE: CPT | Performed by: FAMILY MEDICINE

## 2023-05-09 PROCEDURE — G0103 PSA SCREENING: HCPCS | Performed by: FAMILY MEDICINE

## 2023-05-09 PROCEDURE — 80061 LIPID PANEL: CPT | Performed by: FAMILY MEDICINE

## 2023-05-09 PROCEDURE — 80053 COMPREHEN METABOLIC PANEL: CPT | Performed by: FAMILY MEDICINE

## 2023-05-09 PROCEDURE — 99214 OFFICE O/P EST MOD 30 MIN: CPT | Mod: 25 | Performed by: FAMILY MEDICINE

## 2023-05-09 RX ORDER — FLUTICASONE PROPIONATE 50 MCG
1 SPRAY, SUSPENSION (ML) NASAL DAILY
Qty: 16 G | Refills: 11 | Status: SHIPPED | OUTPATIENT
Start: 2023-05-09 | End: 2024-06-11

## 2023-05-09 RX ORDER — BUPROPION HYDROCHLORIDE 150 MG/1
TABLET ORAL
Qty: 180 TABLET | Refills: 3 | Status: CANCELLED | OUTPATIENT
Start: 2023-05-09

## 2023-05-09 RX ORDER — LISINOPRIL AND HYDROCHLOROTHIAZIDE 20; 25 MG/1; MG/1
1 TABLET ORAL DAILY
Qty: 90 TABLET | Refills: 3 | Status: SHIPPED | OUTPATIENT
Start: 2023-05-09 | End: 2024-06-03

## 2023-05-09 ASSESSMENT — ENCOUNTER SYMPTOMS
DIARRHEA: 0
HEMATOCHEZIA: 0
JOINT SWELLING: 0
EYE PAIN: 0
DIZZINESS: 0
HEMATURIA: 0
FREQUENCY: 0
HEARTBURN: 0
PALPITATIONS: 0
COUGH: 0
PARESTHESIAS: 0
HEADACHES: 0
FEVER: 0
NERVOUS/ANXIOUS: 0
SHORTNESS OF BREATH: 0
ABDOMINAL PAIN: 0
ARTHRALGIAS: 1
DYSURIA: 0
SORE THROAT: 0
CONSTIPATION: 0
CHILLS: 0
MYALGIAS: 0
NAUSEA: 0
WEAKNESS: 1

## 2023-05-09 NOTE — PATIENT INSTRUCTIONS
Check with insurance on Shing[employer] coverage    Fasting labs    Colonoscopy due April 2031    Oral Surgery referral to evaluate right buccal lesion due to chew    Left ear lavaged clear by CMA    ADMONISHED TO QUIT USE OF CHEW.  Finds the patch helpful

## 2023-05-09 NOTE — PROGRESS NOTES
SUBJECTIVE:   CC: Adelfo is an 53 year old who presents for preventative health visit.       5/9/2023     7:18 AM   Additional Questions   Roomed by Betzy SESAY CMA   Patient has been advised of split billing requirements and indicates understanding: Yes  Healthy Habits:     Getting at least 3 servings of Calcium per day:  NO    Bi-annual eye exam:  Yes    Dental care twice a year:  NO    Sleep apnea or symptoms of sleep apnea:  Daytime drowsiness    Diet:  Regular (no restrictions)    Frequency of exercise:  None    Taking medications regularly:  Yes    Medication side effects:  Not applicable    PHQ-2 Total Score: 0    Additional concerns today:  No        Today's PHQ-2 Score:       5/9/2023     7:20 AM   PHQ-2 ( 1999 [employer])   Q1: Little interest or pleasure in doing things 0   Q2: Feeling down, depressed or hopeless 0   PHQ-2 Score 0   Q1: Little interest or pleasure in doing things Not at all   Q2: Feeling down, depressed or hopeless Not at all   PHQ-2 Score 0           Social History     Tobacco Use     Smoking status: Former     Smokeless tobacco: Current     Types: Chew   Vaping Use     Vaping status: Not on file   Substance Use Topics     Alcohol use: Yes     Alcohol/week: 6 pack per week             5/9/2023     7:19 AM   Alcohol Use   Prescreen: >3 drinks/day or >7 drinks/week? No       Last PSA:   Prostate Specific Antigen Screen   Date Value Ref Range Status   12/20/2021 0.57 0.00 - 3.50 ug/L Final       Reviewed orders with patient. Reviewed health maintenance and updated orders accordingly - Yes      Reviewed and updated as needed this visit by clinical staff   Tobacco  Allergies  Meds              Reviewed and updated as needed this visit by Provider                     Review of Systems   Constitutional: Negative for chills and fever.   HENT: Negative for congestion, ear pain, hearing loss and sore throat.    Eyes: Negative for pain and visual disturbance.   Respiratory: Negative for cough and  "shortness of breath.    Cardiovascular: Negative for chest pain, palpitations and peripheral edema.   Gastrointestinal: Negative for abdominal pain, constipation, diarrhea, heartburn, hematochezia and nausea.   Genitourinary: Negative for dysuria, frequency, genital sores, hematuria, impotence and urgency.   Musculoskeletal: Positive for arthralgias. Negative for joint swelling and myalgias.   Skin: Negative for rash.   Neurological: Positive for weakness. Negative for dizziness, headaches and paresthesias.   Psychiatric/Behavioral: Negative for mood changes. The patient is not nervous/anxious.          OBJECTIVE:   /78 (BP Location: Left arm, Patient Position: Sitting, Cuff Size: Adult Large)   Pulse 79   Temp 98.3  F (36.8  C) (Oral)   Resp 16   Ht 1.86 m (6' 1.23\")   Wt 82.6 kg (182 lb)   SpO2 100%   BMI 23.86 kg/m      Physical Exam  GENERAL: healthy, alert and no distress  EYES: Eyes grossly normal to inspection, PERRL and conjunctivae and sclerae normal  HENT: ear canals and TM's normal, nosewithout ulcers or lesions; erythematous and slightly white 1 cm patch in the posterior right buccal region.  NECK: no adenopathy, no asymmetry, masses, or scars and thyroid normal to palpation;  Large amount left ear wax  RESP: lungs clear to auscultation - no rales, rhonchi or wheezes  CV: regular rate and rhythm, normal S1 S2, no S3 or S4, no murmur, click or rub, no peripheral edema and peripheral pulses strong  ABDOMEN: soft, nontender, no hepatosplenomegaly, no masses and bowel sounds normal  RECTAL:  Exam declined.   Will do yearly PSA testing  MS: no gross musculoskeletal defects noted, no edema  SKIN: no suspicious lesions or rashes  NEURO: Normal strength and tone, mentation intact and speech normal  PSYCH: mentation appears normal, affect normal/bright    Prostate Specific Antigen Screen   Date Value Ref Range Status   12/20/2021 0.57 0.00 - 3.50 ug/L Final       ASSESSMENT/PLAN:       ICD-10-CM  "   1. Healthcare maintenance  Z00.00 CBC with platelets     Comprehensive metabolic panel     Lipid panel reflex to direct LDL Fasting      2. Essential hypertension, controlled I10 lisinopril-hydrochlorothiazide (ZESTORETIC) 20-25 MG tablet      3. Nicotine abuse, tobacco chew  Z72.0       4. Allergic rhinitis, unspecified seasonality, unspecified trigger  J30.9 fluticasone (FLONASE) 50 MCG/ACT nasal spray      5. Screening PSA (prostate specific antigen)  Z12.5 Prostate Specific Antigen Screen      6. Oral mucosal lesion, due to chew, needs to see oral surgery for eval of mucosal changes K13.70 Oral Surgery Referral      7. Impacted cerumen of left ear, lavaged clear of wax by CMA H61.22           PLAN:   Check with insurance on Shingrix coverage    Fasting labs    Colonoscopy due April 2031    Oral Surgery referral to evaluate right buccal lesion due to chew    Left ear lavaged clear by CMA    ADMONISHED TO QUIT USE OF CHEW.  Finds the patch helpful    Patient has been advised of split billing requirements and indicates understanding: Yes      COUNSELING:   Reviewed preventive health counseling, as reflected in patient instructions       Regular exercise       Healthy diet/nutrition       Colorectal cancer screening       Prostate cancer screening        He reports that he has quit smoking. His smokeless tobacco use includes chew and chew.        Graeme Arriola MD  [facility]

## 2023-05-09 NOTE — LETTER
May 10, 2023      Adelfo Fierro  [address] [place] [id number]        Dear ,  We are writing to inform you of your test results.        Resulted Orders   CBC with platelets   Result Value Ref Range    WBC Count 4.8 4.0 - 11.0 10e3/uL    RBC Count 4.88 4.40 - 5.90 10e6/uL    Hemoglobin 15.3 13.3 - 17.7 g/dL    Hematocrit 43.3 40.0 - 53.0 %    MCV 89 78 - 100 fL    MCH 31.4 26.5 - 33.0 pg    MCHC 35.3 31.5 - 36.5 g/dL    RDW 11.7 10.0 - 15.0 %    Platelet Count 243 150 - 450 10e3/uL   Comprehensive metabolic panel   Result Value Ref Range    Sodium 136 136 - 145 mmol/L    Potassium 4.5 3.4 - 5.3 mmol/L    Chloride 100 98 - 107 mmol/L    Carbon Dioxide (CO2) 23 22 - 29 mmol/L    Anion Gap 13 7 - 15 mmol/L    Urea Nitrogen 19.6 6.0 - 20.0 mg/dL    Creatinine 1.07 0.67 - 1.17 mg/dL    Calcium 9.5 8.6 - 10.0 mg/dL    Glucose 99 70 - 99 mg/dL    Alkaline Phosphatase 82 40 - 129 U/L    AST 20 10 - 50 U/L    ALT 24 10 - 50 U/L    Protein Total 7.4 6.4 - 8.3 g/dL    Albumin 4.7 3.5 - 5.2 g/dL    Bilirubin Total 0.3 <=1.2 mg/dL    GFR Estimate 83 >60 mL/min/1.73m2      Comment:      eGFR calculated using 2021 CKD-EPI equation.   Lipid panel reflex to direct LDL Fasting   Result Value Ref Range    Cholesterol 174 <200 mg/dL    Triglycerides 76 <150 mg/dL    Direct Measure HDL 42 >=40 mg/dL    LDL Cholesterol Calculated 117 (H) <=100 mg/dL    Non HDL Cholesterol 132 (H) <130 mg/dL    Narrative    Cholesterol  Desirable:  <200 mg/dL    Triglycerides  Normal:  Less than 150 mg/dL  Borderline High:  150-199 mg/dL  High:  200-499 mg/dL  Very High:  Greater than or equal to 500 mg/dL    Direct Measure HDL  Female:  Greater than or equal to 50 mg/dL   Male:  Greater than or equal to 40 mg/dL    LDL Cholesterol  Desirable:  <100mg/dL  Above Desirable:  100-129 mg/dL   Borderline High:  130-159 mg/dL   High:  160-189 mg/dL   Very High:  >= 190 mg/dL    Non HDL Cholesterol  Desirable:  130 mg/dL  Above Desirable:  130-159  mg/dL  Borderline High:  160-189 mg/dL  High:  190-219 mg/dL  Very High:  Greater than or equal to 220 mg/dL   Prostate Specific Antigen Screen   Result Value Ref Range    Prostate Specific Antigen Screen 0.93 0.00 - 3.50 ng/mL    Narrative    This result is obtained using the Roche Elecsys total PSA method on the massimo e801 immunoassay analyzer. Results obtained with different assay methods or kits cannot be used interchangeably.       If you have any questions or concerns, please call the clinic at the number listed above.       Sincerely,      Graeme Arriola MD

## 2023-05-09 NOTE — LETTER
May 12, 2023      Adelfo Fierro  [address] [facility] [id number]        Dear ,  We are writing to inform you of your test results.    Rey Emanuel:   As we discussed over the phone this morning your PSA, although normal, has increased by 0.35 (over 50%) from last time.  Because of this and out of caution I would like you to see Urology in consultation ([phone]).  I have placed a referral and they should be in contact with your.    Your cholesterol panel looks good and the remaining labs are normal.    Resulted Orders   CBC with platelets   Result Value Ref Range    WBC Count 4.8 4.0 - 11.0 10e3/uL    RBC Count 4.88 4.40 - 5.90 10e6/uL    Hemoglobin 15.3 13.3 - 17.7 g/dL    Hematocrit 43.3 40.0 - 53.0 %    MCV 89 78 - 100 fL    MCH 31.4 26.5 - 33.0 pg    MCHC 35.3 31.5 - 36.5 g/dL    RDW 11.7 10.0 - 15.0 %    Platelet Count 243 150 - 450 10e3/uL   Comprehensive metabolic panel   Result Value Ref Range    Sodium 136 136 - 145 mmol/L    Potassium 4.5 3.4 - 5.3 mmol/L    Chloride 100 98 - 107 mmol/L    Carbon Dioxide (CO2) 23 22 - 29 mmol/L    Anion Gap 13 7 - 15 mmol/L    Urea Nitrogen 19.6 6.0 - 20.0 mg/dL    Creatinine 1.07 0.67 - 1.17 mg/dL    Calcium 9.5 8.6 - 10.0 mg/dL    Glucose 99 70 - 99 mg/dL    Alkaline Phosphatase 82 40 - 129 U/L    AST 20 10 - 50 U/L    ALT 24 10 - 50 U/L    Protein Total 7.4 6.4 - 8.3 g/dL    Albumin 4.7 3.5 - 5.2 g/dL    Bilirubin Total 0.3 <=1.2 mg/dL    GFR Estimate 83 >60 mL/min/1.73m2      Comment:      eGFR calculated using 2021 CKD-EPI equation.   Lipid panel reflex to direct LDL Fasting   Result Value Ref Range    Cholesterol 174 <200 mg/dL    Triglycerides 76 <150 mg/dL    Direct Measure HDL 42 >=40 mg/dL    LDL Cholesterol Calculated 117 (H) <=100 mg/dL    Non HDL Cholesterol 132 (H) <130 mg/dL    Narrative    Cholesterol  Desirable:  <200 mg/dL    Triglycerides  Normal:  Less than 150 mg/dL  Borderline High:  150-199 mg/dL  High:  200-499 mg/dL  Very High:  Greater  than or equal to 500 mg/dL    Direct Measure HDL  Female:  Greater than or equal to 50 mg/dL   Male:  Greater than or equal to 40 mg/dL    LDL Cholesterol  Desirable:  <100mg/dL  Above Desirable:  100-129 mg/dL   Borderline High:  130-159 mg/dL   High:  160-189 mg/dL   Very High:  >= 190 mg/dL    Non HDL Cholesterol  Desirable:  130 mg/dL  Above Desirable:  130-159 mg/dL  Borderline High:  160-189 mg/dL  High:  190-219 mg/dL  Very High:  Greater than or equal to 220 mg/dL   Prostate Specific Antigen Screen   Result Value Ref Range    Prostate Specific Antigen Screen 0.93 0.00 - 3.50 ng/mL    Narrative    This result is obtained using the Roche Elecsys total PSA method on the massimo e801 immunoassay analyzer. Results obtained with different assay methods or kits cannot be used interchangeably.       If you have any questions or concerns, please call the clinic at the number listed above.       Sincerely,      Graeme Arriola MD

## 2023-05-12 DIAGNOSIS — R97.20 RISING PSA LEVEL: Primary | ICD-10-CM

## 2023-06-30 ENCOUNTER — OFFICE VISIT (OUTPATIENT)
Dept: UROLOGY | Facility: CLINIC | Age: 53
End: 2023-06-30
Attending: FAMILY MEDICINE
Payer: COMMERCIAL

## 2023-06-30 VITALS — DIASTOLIC BLOOD PRESSURE: 75 MMHG | OXYGEN SATURATION: 99 % | SYSTOLIC BLOOD PRESSURE: 119 MMHG | HEART RATE: 91 BPM

## 2023-06-30 DIAGNOSIS — N40.0 BENIGN PROSTATIC HYPERPLASIA WITHOUT LOWER URINARY TRACT SYMPTOMS: Primary | ICD-10-CM

## 2023-06-30 DIAGNOSIS — R97.20 RISING PSA LEVEL: ICD-10-CM

## 2023-06-30 PROCEDURE — 99243 OFF/OP CNSLTJ NEW/EST LOW 30: CPT | Performed by: UROLOGY

## 2023-06-30 NOTE — PROGRESS NOTES
S: Adelfo Fierro is a pleasant  53 year old male who was requested to be seen by  Graeme Arriola for a consult with regard to patient's elevated PSA.  His recent PSA was found to be   Prostate Specific Antigen Screen   Date Value Ref Range Status   2023 0.93 0.00 - 3.50 ng/mL Final   2021 0.57 0.00 - 3.50 ug/L Final   .  His previous PSA was normal.  Patient complains of Nocturia x 1.  He has no history of elevated PSA.  His AUA Symptom Score:  low.  Current Outpatient Medications   Medication Sig Dispense Refill     blood pressure monitor Kit [BLOOD PRESSURE MONITOR KIT] Use 1 Device As Directed daily. 1 each 0     fluticasone (FLONASE) 50 MCG/ACT nasal spray Spray 1 spray into both nostrils daily 16 g 11     lisinopril-hydrochlorothiazide (ZESTORETIC) 20-25 MG tablet Take 1 tablet by mouth daily 90 tablet 3      Allergies   Allergen Reactions     Other Environmental Allergy Other (See Comments)     Itchy, swollen, watery eyes.      Past Medical History:   Diagnosis Date     Essential hypertension      No past surgical history on file.   Family History   Problem Relation Age of Onset     Hypertension Mother      Hypertension Father      Cerebrovascular Disease Maternal Uncle 45.00             Breast Cancer Maternal Grandmother      Heart Disease Paternal Grandfather 80.00        MI     Hypertension Paternal Grandfather      He does have a family history of prostate cancer.  Social History     Socioeconomic History     Marital status:      Spouse name: Not on file     Number of children: Not on file     Years of education: Not on file     Highest education level: Not on file   Occupational History     Not on file   Tobacco Use     Smoking status: Former     Smokeless tobacco: Current     Types: Chew   Substance and Sexual Activity     Alcohol use: Yes     Alcohol/week: 14.0 standard drinks of alcohol     Drug use: Not on file     Sexual activity: Not on file   Other Topics Concern     Not  on file   Social History Narrative     Not on file     Social Determinants of Health     Financial Resource Strain: Not on file   Food Insecurity: Not on file   Transportation Needs: Not on file   Physical Activity: Not on file   Stress: Not on file   Social Connections: Not on file   Intimate Partner Violence: Not on file   Housing Stability: Not on file        REVIEW OF SYSTEMS  =================  C: NEGATIVE for fever, chills, change in weight  I: NEGATIVE for worrisome rashes, moles or lesions  E/M: NEGATIVE for ear, mouth and throat problems  R: NEGATIVE for significant cough or SHORTNESS OF BREATH  CV:  NEGATIVE for chest pain, palpitations or peripheral edema  GI: NEGATIVE for nausea, abdominal pain, heartburn, or change in bowel habits  NEURO: NEGATIVE  PSYCH: NEGATIVE    Physical Exam:  /75   Pulse 91   SpO2 99%    Patient is pleasant, in no acute distress, good general condition.  Lung: no evidence of respiratory distress    Abdomen: Soft, nondistended, non tender. No masses. No rebound or guarding.   Exam: normal male genitalia.  Prostate 40 gm smooth no nodule  Skin: Warm and dry.  No redness.  Musculaskeletal: moving all extremities.  No weakness.  Neuro non focal  Psych normal mood and affect    Assessment/Plan:   (N40.0) Benign prostatic hyperplasia without lower urinary tract symptoms  (primary encounter diagnosis)  Comment:  Minimal symptoms  Plan: prn    (R97.20) Rising PSA level  Comment: benign exam.  Prostate size 40 gm  Plan: recheck psa in six months.

## 2024-06-03 DIAGNOSIS — I10 ESSENTIAL HYPERTENSION: ICD-10-CM

## 2024-06-04 RX ORDER — LISINOPRIL AND HYDROCHLOROTHIAZIDE 20; 25 MG/1; MG/1
1 TABLET ORAL DAILY
Qty: 90 TABLET | Refills: 0 | Status: SHIPPED | OUTPATIENT
Start: 2024-06-04 | End: 2024-08-23

## 2024-06-11 ENCOUNTER — OFFICE VISIT (OUTPATIENT)
Dept: FAMILY MEDICINE | Facility: CLINIC | Age: 54
End: 2024-06-11
Payer: COMMERCIAL

## 2024-06-11 VITALS
HEART RATE: 103 BPM | RESPIRATION RATE: 20 BRPM | HEIGHT: 73 IN | OXYGEN SATURATION: 99 % | DIASTOLIC BLOOD PRESSURE: 74 MMHG | WEIGHT: 186 LBS | TEMPERATURE: 97.8 F | SYSTOLIC BLOOD PRESSURE: 114 MMHG | BODY MASS INDEX: 24.65 KG/M2

## 2024-06-11 DIAGNOSIS — Z12.5 SCREENING FOR PROSTATE CANCER: ICD-10-CM

## 2024-06-11 DIAGNOSIS — Z13.220 ENCOUNTER FOR LIPID SCREENING FOR CARDIOVASCULAR DISEASE: ICD-10-CM

## 2024-06-11 DIAGNOSIS — I10 ESSENTIAL HYPERTENSION: ICD-10-CM

## 2024-06-11 DIAGNOSIS — Z13.6 ENCOUNTER FOR LIPID SCREENING FOR CARDIOVASCULAR DISEASE: ICD-10-CM

## 2024-06-11 DIAGNOSIS — J30.9 ALLERGIC RHINITIS, UNSPECIFIED SEASONALITY, UNSPECIFIED TRIGGER: Primary | ICD-10-CM

## 2024-06-11 PROCEDURE — 99213 OFFICE O/P EST LOW 20 MIN: CPT | Performed by: FAMILY MEDICINE

## 2024-06-11 RX ORDER — LISINOPRIL AND HYDROCHLOROTHIAZIDE 20; 25 MG/1; MG/1
1 TABLET ORAL DAILY
Qty: 90 TABLET | Refills: 0 | Status: CANCELLED | OUTPATIENT
Start: 2024-06-11

## 2024-06-11 RX ORDER — FLUTICASONE PROPIONATE 50 MCG
1 SPRAY, SUSPENSION (ML) NASAL DAILY
Qty: 16 G | Refills: 11 | Status: SHIPPED | OUTPATIENT
Start: 2024-06-11

## 2024-06-11 ASSESSMENT — PAIN SCALES - GENERAL: PAINLEVEL: NO PAIN (0)

## 2024-06-11 NOTE — PROGRESS NOTES
Assessment & Plan     Allergic rhinitis, unspecified seasonality, unspecified trigger  Doing well on Flonase spray, continue the same treatment, refill provided.  - fluticasone (FLONASE) 50 MCG/ACT nasal spray; Spray 1 spray into both nostrils daily    Essential hypertension  Currently on lisinopril-hydrochlorothiazide.  Blood pressure is well-controlled.  Continue the same treatment    Screening for prostate cancer    - Prostate Specific Antigen Screen; Future    Encounter for lipid screening for cardiovascular disease    - Hemoglobin A1c; Future  - Comprehensive metabolic panel; Future  - CBC with Platelets & Differential; Future  - Lipid panel reflex to direct LDL Fasting; Future              Subjective   Adelfo is a 54 year old, presenting for the following health issues:  Recheck Medication        2024     2:37 PM   Additional Questions   Roomed by Nina DELATORRE   Accompanied by self     History of Present Illness       Hypertension: He presents for follow up of hypertension.  He does not check blood pressure  regularly outside of the clinic. Outpatient blood pressures have not been over 140/90. He does not follow a low salt diet.     He eats 0-1 servings of fruits and vegetables daily.He consumes 0 sweetened beverage(s) daily.He exercises with enough effort to increase his heart rate 9 or less minutes per day.  He exercises with enough effort to increase his heart rate 3 or less days per week.   He is taking medications regularly.       Past Medical History:   Diagnosis Date    Essential hypertension      History reviewed. No pertinent surgical history.    Family History   Problem Relation Age of Onset    Hypertension Mother     Ovarian Cancer Mother     Hypertension Father     Prostate Cancer Father     Breast Cancer Maternal Grandmother     Heart Disease Paternal Grandfather 80        MI    Hypertension Paternal Grandfather     Cerebrovascular Disease Maternal Uncle 45                 SH:    Marital  "status:    Kids: 2  Employment: 2 daughters   Exercise: no   Tobacco: former - quit gato than 20 years   Etoh: yes - couple drinks occasionally   Recreational drugs: no                    Objective    BP (!) 126/90   Pulse 103   Temp 97.8  F (36.6  C) (Tympanic)   Resp 20   Ht 1.861 m (6' 1.25\")   Wt 84.4 kg (186 lb)   SpO2 99%   BMI 24.37 kg/m    Body mass index is 24.37 kg/m .  Physical Exam  Vitals and nursing note reviewed.   Constitutional:       General: He is not in acute distress.     Appearance: He is not ill-appearing, toxic-appearing or diaphoretic.   HENT:      Head: Normocephalic and atraumatic.   Neurological:      Mental Status: He is alert.                    Signed Electronically by: Timur Jimenes MD    "

## 2024-07-12 ENCOUNTER — LAB (OUTPATIENT)
Dept: LAB | Facility: CLINIC | Age: 54
End: 2024-07-12
Payer: COMMERCIAL

## 2024-07-12 DIAGNOSIS — Z13.220 ENCOUNTER FOR LIPID SCREENING FOR CARDIOVASCULAR DISEASE: ICD-10-CM

## 2024-07-12 DIAGNOSIS — Z13.6 ENCOUNTER FOR LIPID SCREENING FOR CARDIOVASCULAR DISEASE: ICD-10-CM

## 2024-07-12 DIAGNOSIS — Z12.5 SCREENING FOR PROSTATE CANCER: ICD-10-CM

## 2024-07-12 LAB
ALBUMIN SERPL BCG-MCNC: 4.5 G/DL (ref 3.5–5.2)
ALP SERPL-CCNC: 100 U/L (ref 40–150)
ALT SERPL W P-5'-P-CCNC: 39 U/L (ref 0–70)
ANION GAP SERPL CALCULATED.3IONS-SCNC: 11 MMOL/L (ref 7–15)
AST SERPL W P-5'-P-CCNC: 29 U/L (ref 0–45)
BASOPHILS # BLD AUTO: 0 10E3/UL (ref 0–0.2)
BASOPHILS NFR BLD AUTO: 0 %
BILIRUB SERPL-MCNC: 0.5 MG/DL
BUN SERPL-MCNC: 14 MG/DL (ref 6–20)
CALCIUM SERPL-MCNC: 9.5 MG/DL (ref 8.6–10)
CHLORIDE SERPL-SCNC: 95 MMOL/L (ref 98–107)
CHOLEST SERPL-MCNC: 182 MG/DL
CREAT SERPL-MCNC: 0.94 MG/DL (ref 0.67–1.17)
DEPRECATED HCO3 PLAS-SCNC: 26 MMOL/L (ref 22–29)
EGFRCR SERPLBLD CKD-EPI 2021: >90 ML/MIN/1.73M2
EOSINOPHIL # BLD AUTO: 0.1 10E3/UL (ref 0–0.7)
EOSINOPHIL NFR BLD AUTO: 1 %
ERYTHROCYTE [DISTWIDTH] IN BLOOD BY AUTOMATED COUNT: 11.8 % (ref 10–15)
FASTING STATUS PATIENT QL REPORTED: YES
FASTING STATUS PATIENT QL REPORTED: YES
GLUCOSE SERPL-MCNC: 96 MG/DL (ref 70–99)
HBA1C MFR BLD: 5.3 % (ref 0–5.6)
HCT VFR BLD AUTO: 43.8 % (ref 40–53)
HDLC SERPL-MCNC: 46 MG/DL
HGB BLD-MCNC: 15.4 G/DL (ref 13.3–17.7)
IMM GRANULOCYTES # BLD: 0 10E3/UL
IMM GRANULOCYTES NFR BLD: 0 %
LDLC SERPL CALC-MCNC: 118 MG/DL
LYMPHOCYTES # BLD AUTO: 1 10E3/UL (ref 0.8–5.3)
LYMPHOCYTES NFR BLD AUTO: 19 %
MCH RBC QN AUTO: 31.4 PG (ref 26.5–33)
MCHC RBC AUTO-ENTMCNC: 35.2 G/DL (ref 31.5–36.5)
MCV RBC AUTO: 89 FL (ref 78–100)
MONOCYTES # BLD AUTO: 0.6 10E3/UL (ref 0–1.3)
MONOCYTES NFR BLD AUTO: 11 %
NEUTROPHILS # BLD AUTO: 3.5 10E3/UL (ref 1.6–8.3)
NEUTROPHILS NFR BLD AUTO: 69 %
NONHDLC SERPL-MCNC: 136 MG/DL
PLATELET # BLD AUTO: 212 10E3/UL (ref 150–450)
POTASSIUM SERPL-SCNC: 4.4 MMOL/L (ref 3.4–5.3)
PROT SERPL-MCNC: 7.5 G/DL (ref 6.4–8.3)
PSA SERPL DL<=0.01 NG/ML-MCNC: 0.59 NG/ML (ref 0–3.5)
RBC # BLD AUTO: 4.9 10E6/UL (ref 4.4–5.9)
SODIUM SERPL-SCNC: 132 MMOL/L (ref 135–145)
TRIGL SERPL-MCNC: 88 MG/DL
WBC # BLD AUTO: 5.2 10E3/UL (ref 4–11)

## 2024-07-12 PROCEDURE — 83036 HEMOGLOBIN GLYCOSYLATED A1C: CPT

## 2024-07-12 PROCEDURE — G0103 PSA SCREENING: HCPCS

## 2024-07-12 PROCEDURE — 85025 COMPLETE CBC W/AUTO DIFF WBC: CPT

## 2024-07-12 PROCEDURE — 80061 LIPID PANEL: CPT

## 2024-07-12 PROCEDURE — 80053 COMPREHEN METABOLIC PANEL: CPT

## 2024-07-12 PROCEDURE — 36415 COLL VENOUS BLD VENIPUNCTURE: CPT

## 2024-08-22 DIAGNOSIS — I10 ESSENTIAL HYPERTENSION: ICD-10-CM

## 2024-08-23 RX ORDER — LISINOPRIL AND HYDROCHLOROTHIAZIDE 20; 25 MG/1; MG/1
1 TABLET ORAL DAILY
Qty: 90 TABLET | Refills: 0 | Status: SHIPPED | OUTPATIENT
Start: 2024-08-23

## 2024-11-05 ENCOUNTER — PATIENT OUTREACH (OUTPATIENT)
Dept: CARE COORDINATION | Facility: CLINIC | Age: 54
End: 2024-11-05
Payer: COMMERCIAL

## 2025-02-12 ENCOUNTER — OFFICE VISIT (OUTPATIENT)
Dept: FAMILY MEDICINE | Facility: CLINIC | Age: 55
End: 2025-02-12
Payer: COMMERCIAL

## 2025-02-12 VITALS
OXYGEN SATURATION: 97 % | DIASTOLIC BLOOD PRESSURE: 96 MMHG | BODY MASS INDEX: 25.45 KG/M2 | HEIGHT: 73 IN | WEIGHT: 192 LBS | TEMPERATURE: 98.1 F | RESPIRATION RATE: 16 BRPM | SYSTOLIC BLOOD PRESSURE: 141 MMHG | HEART RATE: 96 BPM

## 2025-02-12 DIAGNOSIS — M79.672 BILATERAL FOOT PAIN: ICD-10-CM

## 2025-02-12 DIAGNOSIS — R06.83 SNORING: ICD-10-CM

## 2025-02-12 DIAGNOSIS — Z00.00 ROUTINE GENERAL MEDICAL EXAMINATION AT A HEALTH CARE FACILITY: Primary | ICD-10-CM

## 2025-02-12 DIAGNOSIS — G47.00 INSOMNIA, UNSPECIFIED TYPE: ICD-10-CM

## 2025-02-12 DIAGNOSIS — M79.671 BILATERAL FOOT PAIN: ICD-10-CM

## 2025-02-12 DIAGNOSIS — E87.1 HYPONATREMIA: ICD-10-CM

## 2025-02-12 DIAGNOSIS — I10 ESSENTIAL HYPERTENSION: ICD-10-CM

## 2025-02-12 DIAGNOSIS — Z11.59 NEED FOR HEPATITIS C SCREENING TEST: ICD-10-CM

## 2025-02-12 PROCEDURE — 84550 ASSAY OF BLOOD/URIC ACID: CPT | Performed by: PHYSICIAN ASSISTANT

## 2025-02-12 PROCEDURE — 36415 COLL VENOUS BLD VENIPUNCTURE: CPT | Performed by: PHYSICIAN ASSISTANT

## 2025-02-12 PROCEDURE — 99214 OFFICE O/P EST MOD 30 MIN: CPT | Mod: 25 | Performed by: PHYSICIAN ASSISTANT

## 2025-02-12 PROCEDURE — 86803 HEPATITIS C AB TEST: CPT | Performed by: PHYSICIAN ASSISTANT

## 2025-02-12 PROCEDURE — G2211 COMPLEX E/M VISIT ADD ON: HCPCS | Performed by: PHYSICIAN ASSISTANT

## 2025-02-12 PROCEDURE — 80048 BASIC METABOLIC PNL TOTAL CA: CPT | Performed by: PHYSICIAN ASSISTANT

## 2025-02-12 PROCEDURE — 99396 PREV VISIT EST AGE 40-64: CPT | Performed by: PHYSICIAN ASSISTANT

## 2025-02-12 RX ORDER — HYDROXYZINE HYDROCHLORIDE 25 MG/1
25 TABLET, FILM COATED ORAL 3 TIMES DAILY PRN
Qty: 60 TABLET | Refills: 3 | Status: SHIPPED | OUTPATIENT
Start: 2025-02-12

## 2025-02-12 RX ORDER — LISINOPRIL AND HYDROCHLOROTHIAZIDE 20; 25 MG/1; MG/1
1 TABLET ORAL DAILY
Qty: 90 TABLET | Refills: 3 | Status: SHIPPED | OUTPATIENT
Start: 2025-02-12 | End: 2025-02-13

## 2025-02-12 SDOH — HEALTH STABILITY: PHYSICAL HEALTH
ON AVERAGE, HOW MANY DAYS PER WEEK DO YOU ENGAGE IN MODERATE TO STRENUOUS EXERCISE (LIKE A BRISK WALK)?: PATIENT DECLINED

## 2025-02-12 SDOH — HEALTH STABILITY: PHYSICAL HEALTH: ON AVERAGE, HOW MANY MINUTES DO YOU ENGAGE IN EXERCISE AT THIS LEVEL?: 0 MIN

## 2025-02-12 ASSESSMENT — ANXIETY QUESTIONNAIRES
GAD7 TOTAL SCORE: 7
3. WORRYING TOO MUCH ABOUT DIFFERENT THINGS: MORE THAN HALF THE DAYS
6. BECOMING EASILY ANNOYED OR IRRITABLE: NOT AT ALL
GAD7 TOTAL SCORE: 7
1. FEELING NERVOUS, ANXIOUS, OR ON EDGE: MORE THAN HALF THE DAYS
2. NOT BEING ABLE TO STOP OR CONTROL WORRYING: NOT AT ALL
5. BEING SO RESTLESS THAT IT IS HARD TO SIT STILL: SEVERAL DAYS
7. FEELING AFRAID AS IF SOMETHING AWFUL MIGHT HAPPEN: NOT AT ALL
IF YOU CHECKED OFF ANY PROBLEMS ON THIS QUESTIONNAIRE, HOW DIFFICULT HAVE THESE PROBLEMS MADE IT FOR YOU TO DO YOUR WORK, TAKE CARE OF THINGS AT HOME, OR GET ALONG WITH OTHER PEOPLE: NOT DIFFICULT AT ALL

## 2025-02-12 ASSESSMENT — SOCIAL DETERMINANTS OF HEALTH (SDOH): HOW OFTEN DO YOU GET TOGETHER WITH FRIENDS OR RELATIVES?: ONCE A WEEK

## 2025-02-12 ASSESSMENT — PATIENT HEALTH QUESTIONNAIRE - PHQ9: 5. POOR APPETITE OR OVEREATING: MORE THAN HALF THE DAYS

## 2025-02-12 ASSESSMENT — ENCOUNTER SYMPTOMS: NERVOUS/ANXIOUS: 1

## 2025-02-12 NOTE — PROGRESS NOTES
Preventive Care Visit  [facility]  Sb Lugo PA-C, Family Medicine  Feb 12, 2025      Assessment & Plan     Routine general medical examination at a health care facility  Overall stable wellness other than below.  Nonfasting today but fasting labs were all obtained in July of last year.  LDL is higher than ideal but not in the range for medication was necessary.  Discussed healthy diet and exercise habits and would recommend rechecking in 6 to 12 months.    Essential hypertension  Elevated today however not taking his medications.  Will restart and recheck metabolic panel today given hyponatremia in the past.  He will likely need this rechecked after reinitiation of medication.  However if uric acid levels are elevated, will consider stopping hydrochlorothiazide aspect of his medication.  Also may consider changing lisinopril to losartan as well.  - lisinopril-hydrochlorothiazide (ZESTORETIC) 20-25 MG tablet; Take 1 tablet by mouth daily.  - Basic metabolic panel  (Ca, Cl, CO2, Creat, Gluc, K, Na, BUN); Future  - Basic metabolic panel  (Ca, Cl, CO2, Creat, Gluc, K, Na, BUN)  Medication use and side effects discussed with the patient. Patient is in complete understanding and agreement with plan.     Need for hepatitis C screening test    - Hepatitis C Screen Reflex to HCV RNA Quant and Genotype; Future  - Hepatitis C Screen Reflex to HCV RNA Quant and Genotype    Snoring  Reported history.  May be contributing to his blood pressure if present for sleep apnea.  Will have consult with sleep medicine and likely in-home sleep study.  - Adult Sleep Eval & Management  Referral; Future    Bilateral foot pain  Reported.  Potential osteoarthritis given description and start up pain history.  Otherwise, consider hyperuricemia.  Will check uric acid as well and adjust hydrochlorothiazide as above if present.  Otherwise if uric acid within normal limits, to follow-up with  "podiatry.  Referral placed.  - Uric acid; Future  - Orthopedic  Referral; Future  - Uric acid    Insomnia, unspecified type  Reported history.  May be related to possible sleep apnea waking patient at night however possible anxiety throughout the night/psychosomatic insomnia.  Discussed options including hydroxyzine as needed.  Patient would like to attempt this.  If needing on regular basis, would then suggest possible cognitive behavioral therapy.  - hydrOXYzine HCl (ATARAX) 25 MG tablet; Take 1 tablet (25 mg) by mouth 3 times daily as needed for anxiety (and sleep).  Medication use and side effects discussed with the patient. Patient is in complete understanding and agreement with plan.     Patient has been advised of split billing requirements and indicates understanding: Yes        BMI  Estimated body mass index is 25.33 kg/m  as calculated from the following:    Height as of this encounter: 1.854 m (6' 1\").    Weight as of this encounter: 87.1 kg (192 lb).       Counseling  Appropriate preventive services were addressed with this patient via screening, questionnaire, or discussion as appropriate for fall prevention, nutrition, physical activity, Tobacco-use cessation, social engagement, weight loss and cognition.  Checklist reviewing preventive services available has been given to the patient.  Reviewed patient's diet, addressing concerns and/or questions.   The patient was instructed to see the dentist every 6 months.   He is at risk for psychosocial distress and has been provided with information to reduce risk.         Leonora Emanuel is a 54 year old, presenting for the following:  Physical, Ankle Pain (bilateral), Snoring (Sleep study), and Anxiety        2/12/2025     2:13 PM   Additional Questions   Roomed by Annmarie RAMIREZ CMA   Accompanied by Self          Ankle Pain    Anxiety      Patient is a 54-year-old male with a past history of hypertension.  He presents today for follow-up of his " hypertension as well as his wellness visit and other below concerns    In regards to hypertension, stable on lisinopril hydrochlorothiazide.  However has not taken for approximately 2 months due to not being able to receive refills.  He denies any side effects with this medicine however, July of last year sodium levels were 132 and chloride was 95.  He has not had these levels rechecked.  Denies any symptoms of hyponatremia.  Does state have blood pressure cuff at home    He also has concerns of possible sleep apnea.  Wife states he snores quite often.  He is requesting a potential sleep study.  He also admits to ongoing insomnia concerns.  Specifically he wakes up often in the middle the night and uses the restroom but then cannot fall asleep ruminating on multiple thoughts and concerns such as the safety of his daughter who is living in [place], other daughter as well, work related stressors, etc.  He denies any ongoing anxiety throughout the day.  Patient works construction.  No treatments tried.    Ongoing bilateral foot pain for approximately 1 year.  Localized to the upper aspects of the foot and patient localize it specifically pointing at the talar joint.  States symptoms are worse after prolonged sitting and then standing.  He does stand on his feet throughout the day.  No obvious acute trauma.  No swelling or warmth.  No history of gout.  He is requesting a referral to podiatry for further evaluation.      Health Care Directive  Patient does not have a Health Care Directive: Discussed advance care planning with patient; information given to patient to review.      2/12/2025   General Health   How would you rate your overall physical health? Good   Feel stress (tense, anxious, or unable to sleep) Rather much   (!) STRESS CONCERN      2/12/2025   Nutrition   Three or more servings of calcium each day? Yes   Diet: Regular (no restrictions)   How many servings of fruit and vegetables per day? (!) 0-1   How  many sweetened beverages each day? 0-1         2/12/2025   Exercise   Days per week of moderate/strenous exercise Patient declined   Average minutes spent exercising at this level 0 min         2/12/2025   Social Factors   Frequency of gathering with friends or relatives Once a week   Worry food won't last until get money to buy more No   Food not last or not have enough money for food? No   Do you have housing? (Housing is defined as stable permanent housing and does not include staying ouside in a car, in a tent, in an abandoned building, in an overnight shelter, or couch-surfing.) Yes   Are you worried about losing your housing? No   Lack of transportation? No   Unable to get utilities (heat,electricity)? No         2/12/2025   Fall Risk   Fallen 2 or more times in the past year? No   Trouble with walking or balance? No          2/12/2025   Dental   Dentist two times every year? (!) NO            Today's PHQ-2 Score:       2/12/2025     2:08 PM   PHQ-2 ( 1999 [employer])   Q1: Little interest or pleasure in doing things 0   Q2: Feeling down, depressed or hopeless 1   PHQ-2 Score 1    Q1: Little interest or pleasure in doing things Not at all   Q2: Feeling down, depressed or hopeless Several days   PHQ-2 Score 1       Patient-reported           2/12/2025   Substance Use   Alcohol more than 3/day or more than 7/wk No   Do you use any other substances recreationally? No     Social History     Tobacco Use    Smoking status: Never    Smokeless tobacco: Former     Types: Chew     Quit date: 02/2024   Vaping Use    Vaping status: Never Used   Substance Use Topics    Alcohol use: Yes     Alcohol/week: 14.0 standard drinks of alcohol             2/12/2025   One time HIV Screening   Previous HIV test? No         2/12/2025   STI Screening   New sexual partner(s) since last STI/HIV test? No   ASCVD Risk   The 10-year ASCVD risk score (Adelina CLEMENTS, et al., 2019) is: 6.8%    Values used to calculate the score:      Age: 54  "years      Sex: Male      Is Non- : No      Diabetic: No      Tobacco smoker: No      Systolic Blood Pressure: 141 mmHg      Is BP treated: Yes      HDL Cholesterol: 46 mg/dL      Total Cholesterol: 182 mg/dL           Reviewed and updated as needed this visit by Provider   Tobacco  Allergies  Meds  Problems  Med Hx  Surg Hx  Fam Hx                   Objective    Exam  BP (!) 141/96 (BP Location: Left arm, Patient Position: Sitting, Cuff Size: Adult Regular)   Pulse 96   Temp 98.1  F (36.7  C) (Oral)   Resp 16   Ht 1.854 m (6' 1\")   Wt 87.1 kg (192 lb)   SpO2 97%   BMI 25.33 kg/m     Estimated body mass index is 25.33 kg/m  as calculated from the following:    Height as of this encounter: 1.854 m (6' 1\").    Weight as of this encounter: 87.1 kg (192 lb).    Physical Exam  GENERAL: alert and no distress  EYES: Eyes grossly normal to inspection, PERRL and conjunctivae and sclerae normal  HENT: ear canals and TM's normal, nose and mouth without ulcers or lesions  NECK: no adenopathy, no asymmetry, masses, or scars  RESP: lungs clear to auscultation - no rales, rhonchi or wheezes  CV: regular rate and rhythm, normal S1 S2, no S3 or S4, no murmur, click or rub, no peripheral edema  ABDOMEN: soft, nontender, no hepatosplenomegaly, no masses and bowel sounds normal  MS: no gross musculoskeletal defects noted, no edema  SKIN: no suspicious lesions or rashes  NEURO: Normal strength and tone, mentation intact and speech normal  PSYCH: mentation appears normal, affect normal/bright  LYMPH: no cervical adenopathy        Signed Electronically by: Sb Lugo PA-C    "

## 2025-02-12 NOTE — PATIENT INSTRUCTIONS
Patient Education   Preventive Care Advice   This is general advice given by our system to help you stay healthy. However, your care team may have specific advice just for you. Please talk to your care team about your preventive care needs.  Nutrition  Eat 5 or more servings of fruits and vegetables each day.  Try wheat bread, brown rice and whole grain pasta (instead of white bread, rice, and pasta).  Get enough calcium and vitamin D. Check the label on foods and aim for 100% of the RDA (recommended daily allowance).  Lifestyle  Exercise at least 150 minutes each week  (30 minutes a day, 5 days a week).  Do muscle strengthening activities 2 days a week. These help control your weight and prevent disease.  No smoking.  Wear sunscreen to prevent skin cancer.  Have a dental exam and cleaning every 6 months.  Yearly exams  See your health care team every year to talk about:  Any changes in your health.  Any medicines your care team has prescribed.  Preventive care, family planning, and ways to prevent chronic diseases.  Shots (vaccines)   HPV shots (up to age 26), if you've never had them before.  Hepatitis B shots (up to age 59), if you've never had them before.  COVID-19 shot: Get this shot when it's due.  Flu shot: Get a flu shot every year.  Tetanus shot: Get a tetanus shot every 10 years.  Pneumococcal, hepatitis A, and RSV shots: Ask your care team if you need these based on your risk.  Shingles shot (for age 50 and up)  General health tests  Diabetes screening:  Starting at age 35, Get screened for diabetes at least every 3 years.  If you are younger than age 35, ask your care team if you should be screened for diabetes.  Cholesterol test: At age 39, start having a cholesterol test every 5 years, or more often if advised.  Bone density scan (DEXA): At age 50, ask your care team if you should have this scan for osteoporosis (brittle bones).  Hepatitis C: Get tested at least once in your life.  STIs (sexually  transmitted infections)  Before age 24: Ask your care team if you should be screened for STIs.  After age 24: Get screened for STIs if you're at risk. You are at risk for STIs (including HIV) if:  You are sexually active with more than one person.  You don't use condoms every time.  You or a partner was diagnosed with a sexually transmitted infection.  If you are at risk for HIV, ask about PrEP medicine to prevent HIV.  Get tested for HIV at least once in your life, whether you are at risk for HIV or not.  Cancer screening tests  Cervical cancer screening: If you have a cervix, begin getting regular cervical cancer screening tests starting at age 21.  Breast cancer scan (mammogram): If you've ever had breasts, begin having regular mammograms starting at age 40. This is a scan to check for breast cancer.  Colon cancer screening: It is important to start screening for colon cancer at age 45.  Have a colonoscopy test every 10 years (or more often if you're at risk) Or, ask your provider about stool tests like a FIT test every year or Cologuard test every 3 years.  To learn more about your testing options, visit:   .  For help making a decision, visit:   https://bit.ly/lq68569.  Prostate cancer screening test: If you have a prostate, ask your care team if a prostate cancer screening test (PSA) at age 55 is right for you.  Lung cancer screening: If you are a current or former smoker ages 50 to 80, ask your care team if ongoing lung cancer screenings are right for you.  For informational purposes only. Not to replace the advice of your health care provider. Copyright   2023 [facility]. All rights reserved. Clinically reviewed by the [facility]. IMASTE 803225 - REV 01/24.  Learning About Stress  What is stress?     Stress is your body's response to a hard situation. Your body can have a physical, emotional, or mental response. Stress is a fact of life for most people, and it  affects everyone differently. What causes stress for you may not be stressful for someone else.  A lot of things can cause stress. You may feel stress when you go on a job interview, take a test, or run a race. This kind of short-term stress is normal and even useful. It can help you if you need to work hard or react quickly. For example, stress can help you finish an important job on time.  Long-term stress is caused by ongoing stressful situations or events. Examples of long-term stress include long-term health problems, ongoing problems at work, or conflicts in your family. Long-term stress can harm your health.  How does stress affect your health?  When you are stressed, your body responds as though you are in danger. It makes hormones that speed up your heart, make you breathe faster, and give you a burst of energy. This is called the fight-or-flight stress response. If the stress is over quickly, your body goes back to normal and no harm is done.  But if stress happens too often or lasts too long, it can have bad effects. Long-term stress can make you more likely to get sick, and it can make symptoms of some diseases worse. If you tense up when you are stressed, you may develop neck, shoulder, or low back pain. Stress is linked to high blood pressure and heart disease.  Stress also harms your emotional health. It can make you rodriguez, tense, or depressed. Your relationships may suffer, and you may not do well at work or school.  What can you do to manage stress?  You can try these things to help manage stress:   Do something active. Exercise or activity can help reduce stress. Walking is a great way to get started. Even everyday activities such as housecleaning or yard work can help.  Try yoga or venancio chi. These techniques combine exercise and meditation. You may need some training at first to learn them.  Do something you enjoy. For example, listen to music or go to a movie. Practice your hobby or do volunteer  "work.  Meditate. This can help you relax, because you are not worrying about what happened before or what may happen in the future.  Do guided imagery. Imagine yourself in any setting that helps you feel calm. You can use online videos, books, or a teacher to guide you.  Do breathing exercises. For example:  From a standing position, bend forward from the waist with your knees slightly bent. Let your arms dangle close to the floor.  Breathe in slowly and deeply as you return to a standing position. Roll up slowly and lift your head last.  Hold your breath for just a few seconds in the standing position.  Breathe out slowly and bend forward from the waist.  Let your feelings out. Talk, laugh, cry, and express anger when you need to. Talking with supportive friends or family, a counselor, or a jonah leader about your feelings is a healthy way to relieve stress. Avoid discussing your feelings with people who make you feel worse.  Write. It may help to write about things that are bothering you. This helps you find out how much stress you feel and what is causing it. When you know this, you can find better ways to cope.  What can you do to prevent stress?  You might try some of these things to help prevent stress:  Manage your time. This helps you find time to do the things you want and need to do.  Get enough sleep. Your body recovers from the stresses of the day while you are sleeping.  Get support. Your family, friends, and community can make a difference in how you experience stress.  Limit your news feed. Avoid or limit time on social media or news that may make you feel stressed.  Do something active. Exercise or activity can help reduce stress. Walking is a great way to get started.  Where can you learn more?  Go to https://www.[employer].net/patiented  Enter N032 in the search box to learn more about \"Learning About Stress.\"  Current as of: October 24, 2023  Content Version: 14.3    2024 [employer]. "   Care instructions adapted under license by your healthcare professional. If you have questions about a medical condition or this instruction, always ask your healthcare professional. [employer] disclaims any warranty or liability for your use of this information.

## 2025-02-13 ENCOUNTER — PATIENT OUTREACH (OUTPATIENT)
Dept: CARE COORDINATION | Facility: CLINIC | Age: 55
End: 2025-02-13
Payer: COMMERCIAL

## 2025-02-13 ENCOUNTER — TELEPHONE (OUTPATIENT)
Dept: FAMILY MEDICINE | Facility: CLINIC | Age: 55
End: 2025-02-13
Payer: COMMERCIAL

## 2025-02-13 LAB
ANION GAP SERPL CALCULATED.3IONS-SCNC: 16 MMOL/L (ref 7–15)
BUN SERPL-MCNC: 19.7 MG/DL (ref 6–20)
CALCIUM SERPL-MCNC: 9.6 MG/DL (ref 8.8–10.4)
CHLORIDE SERPL-SCNC: 95 MMOL/L (ref 98–107)
CREAT SERPL-MCNC: 0.92 MG/DL (ref 0.67–1.17)
EGFRCR SERPLBLD CKD-EPI 2021: >90 ML/MIN/1.73M2
GLUCOSE SERPL-MCNC: 86 MG/DL (ref 70–99)
HCO3 SERPL-SCNC: 22 MMOL/L (ref 22–29)
HCV AB SERPL QL IA: NONREACTIVE
POTASSIUM SERPL-SCNC: 3.9 MMOL/L (ref 3.4–5.3)
SODIUM SERPL-SCNC: 133 MMOL/L (ref 135–145)
URATE SERPL-MCNC: 4.8 MG/DL (ref 3.4–7)

## 2025-02-13 RX ORDER — LISINOPRIL 20 MG/1
20 TABLET ORAL DAILY
Qty: 90 TABLET | Refills: 3 | Status: SHIPPED | OUTPATIENT
Start: 2025-02-13

## 2025-02-18 ENCOUNTER — ANCILLARY PROCEDURE (OUTPATIENT)
Dept: GENERAL RADIOLOGY | Facility: CLINIC | Age: 55
End: 2025-02-18
Attending: PODIATRIST
Payer: COMMERCIAL

## 2025-02-18 ENCOUNTER — OFFICE VISIT (OUTPATIENT)
Dept: PODIATRY | Facility: CLINIC | Age: 55
End: 2025-02-18
Attending: PHYSICIAN ASSISTANT
Payer: COMMERCIAL

## 2025-02-18 VITALS — BODY MASS INDEX: 24.9 KG/M2 | HEIGHT: 74 IN | WEIGHT: 194 LBS

## 2025-02-18 DIAGNOSIS — M19.072 OSTEOARTHRITIS OF LEFT ANKLE AND FOOT: Primary | ICD-10-CM

## 2025-02-18 DIAGNOSIS — M79.673 FOOT PAIN: ICD-10-CM

## 2025-02-18 PROCEDURE — 73630 X-RAY EXAM OF FOOT: CPT | Mod: TC | Performed by: RADIOLOGY

## 2025-02-18 PROCEDURE — 99203 OFFICE O/P NEW LOW 30 MIN: CPT | Performed by: PODIATRIST

## 2025-02-18 RX ORDER — DICLOFENAC SODIUM 75 MG/1
75 TABLET, DELAYED RELEASE ORAL 2 TIMES DAILY
Qty: 60 TABLET | Refills: 1 | Status: SHIPPED | OUTPATIENT
Start: 2025-02-18

## 2025-02-18 ASSESSMENT — PAIN SCALES - GENERAL: PAINLEVEL_OUTOF10: SEVERE PAIN (9)

## 2025-02-18 NOTE — LETTER
2/18/2025      Adelfo Fierro  [address] [facility] [address]      Dear Colleague,    Thank you for referring your patient, Adelfo Fierro, to the [facility]. Please see a copy of my visit note below.    HPI:  Adelfo Fierro is a 54 year old male who is seen in consultation at the request of Sb Lugo PA-C    Pt presents for eval of:   (Onset, Location, L/R, Character, Treatments, Injury if yes)    XR Left and Right foot 2/18/2025     Onset late Jan 2025, anterior and dorsal Left > Right foot/ankle pain. Presents with Dude shoes  Constant swelling, dull ache. Intermittent stabbing, sharp, throbbing pain 2-9/10 that is worse walking down the stairs and walking. 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    Patient Active Problem List   Diagnosis     Essential hypertension     Allergic rhinitis       PAST MEDICAL HISTORY:   Past Medical History:   Diagnosis Date     Essential hypertension         PAST SURGICAL HISTORY: History reviewed. No pertinent surgical history.     MEDICATIONS:   Current Outpatient Medications:      diclofenac (VOLTAREN) 75 MG EC tablet, Take 1 tablet (75 mg) by mouth 2 times daily., Disp: 60 tablet, Rfl: 1     hydrOXYzine HCl (ATARAX) 25 MG tablet, Take 1 tablet (25 mg) by mouth 3 times daily as needed for anxiety (and sleep)., Disp: 60 tablet, Rfl: 3     lisinopril (ZESTRIL) 20 MG tablet, Take 1 tablet (20 mg) by mouth daily., Disp: 90 tablet, Rfl: 3     fluticasone (FLONASE) 50 MCG/ACT nasal spray, Spray 1 spray into both nostrils daily (Patient not taking: Reported on 2/18/2025), Disp: 16 g, Rfl: 11     ALLERGIES:    Allergies   Allergen Reactions     Other Environmental Allergy Other (See Comments)     Itchy, swollen, watery eyes.        SOCIAL HISTORY:   Social History     Socioeconomic  History     Marital status:      Spouse name: Not on file     Number of children: Not on file     Years of education: Not on file     Highest education level: Not on file   Occupational History     Not on file   Tobacco Use     Smoking status: Never     Smokeless tobacco: Former     Types: Chew     Quit date: 02/2024   Vaping Use     Vaping status: Never Used   Substance and Sexual Activity     Alcohol use: Yes     Alcohol/week: 14.0 standard drinks of alcohol     Drug use: Not on file     Sexual activity: Not on file   Other Topics Concern     Not on file   Social History Narrative     Not on file     Social Drivers of Health     Financial Resource Strain: Low Risk  (2/12/2025)    Financial Resource Strain      Within the past 12 months, have you or your family members you live with been unable to get utilities (heat, electricity) when it was really needed?: No   Food Insecurity: Low Risk  (2/12/2025)    Food Insecurity      Within the past 12 months, did you worry that your food would run out before you got money to buy more?: No      Within the past 12 months, did the food you bought just not last and you didn t have money to get more?: No   Transportation Needs: Low Risk  (2/12/2025)    Transportation Needs      Within the past 12 months, has lack of transportation kept you from medical appointments, getting your medicines, non-medical meetings or appointments, work, or from getting things that you need?: No   Physical Activity: Unknown (2/12/2025)    Exercise Vital Sign      Days of Exercise per Week: Patient declined      Minutes of Exercise per Session: 0 min   Stress: Stress Concern Present (2/12/2025)    [place] [employer]ational [employer] - Occupational Stress Questionnaire      Feeling of Stress : Rather much   Social Connections: Unknown (2/12/2025)    Social Connection and Isolation Panel [NHANES]      Frequency of Communication with Friends and Family: Not on file      Frequency of Social  "Gatherings with Friends and Family: Once a week      Attends Yazidism Services: Not on file      Active Member of Clubs or Organizations: Not on file      Attends Club or Organization Meetings: Not on file      Marital Status: Not on file   Interpersonal Safety: Low Risk  (2025)    Interpersonal Safety      Do you feel physically and emotionally safe where you currently live?: Yes      Within the past 12 months, have you been hit, slapped, kicked or otherwise physically hurt by someone?: No      Within the past 12 months, have you been humiliated or emotionally abused in other ways by your partner or ex-partner?: No   Housing Stability: Low Risk  (2025)    Housing Stability      Do you have housing? : Yes      Are you worried about losing your housing?: No        FAMILY HISTORY:   Family History   Problem Relation Age of Onset     Hypertension Mother      Ovarian Cancer Mother      Hypertension Father      Prostate Cancer Father      Breast Cancer Maternal Grandmother      Heart Disease Paternal Grandfather 80        MI     Hypertension Paternal Grandfather      Cerebrovascular Disease Maternal Uncle 45                EXAM:Vitals: Ht 1.867 m (6' 1.5\")   Wt 88 kg (194 lb)   BMI 25.25 kg/m    BMI= Body mass index is 25.25 kg/m .    General appearance: Patient is alert and fully cooperative with history & exam.  No sign of distress is noted during the visit.     Psychiatric: Affect is pleasant & appropriate.  Patient appears motivated to improve health.     Respiratory: Breathing is regular & unlabored while sitting.     HEENT: Hearing is intact to spoken word.  Speech is clear.  No gross evidence of visual impairment that would impact ambulation.     Vascular: DP & PT pulses are intact & regular bilaterally.  No significant edema or varicosities noted.  CFT and skin temperature is normal to both lower extremities.     Neurologic: Lower extremity sensation is intact to light touch.  No evidence of " weakness or contracture in the lower extremities.  No evidence of neuropathy.    Dermatologic: Skin is intact to both lower extremities with adequate texture, turgor and tone about the integument.  No paronychia or evidence of soft tissue infection is noted.     Musculoskeletal: Patient is ambulatory without assistive device or brace.  Subtle crepitus is noted through the right and left ankle and subtalar joint rear foot of the left and right ankles.  Palpable induration noted about the left greater than right ankle but no erythema or heat.  Negative drawer but there is subtle crepitus through range of motion of the ankle and rear foot bilateral.    Radiographs: 3 views bilateral feet and ankles 2/18/2025 increased soft tissue density is noted about the left greater than right ankle.  Mild sclerotic change noted about the subtalar joint bilateral.     ASSESSMENT:       ICD-10-CM    1. Osteoarthritis of left ankle and foot  M19.072 diclofenac (VOLTAREN) 75 MG EC tablet     Orthotics, Mastectomy and Custom Compression Orders           PLAN:  Reviewed patient's chart in [employer].      2/18/2025   Recommend wearing supportive shoe gear.  He admits that when he is wearing shoe gear overall his feet feel better.  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  We discussed over shoes.  We discussed ankle bracing and oral anti-inflammatories.  Placed an order for custom molded orthotics.  Also begin oral anti-inflammatory Voltaren for 1 or 2 months then discontinue this and should not be continued long-term.  Do not utilize ibuprofen or Naprosyn in addition to this.  Follow-up in the next couple months if this remains symptomatic otherwise as needed.  Written instructions dispensed.      Adelfo Jules DPM      Again, thank you for allowing me to participate in the care of your patient.         Sincerely,        Adelfo Jules DPM    Electronically signed

## 2025-02-18 NOTE — PATIENT INSTRUCTIONS
Reliable shoe stores: To maximize your experience and provide the best possible fit.  Be sure to show them your foot concerns and tell them Dr. Jules sent you.      Stores listed in bold have only athletic shoes, and stores that are not bold are mostly casual or variety of shoes    [employer]  [address] [place] [address]  [phone]    [employer] [place]  [address] [place] [address]  [phone]    [employer] [facility]ol[facility]pkins  [address] [place] [address]  [phone]    [employer]  [address] [facility]  [facility] [address]  [phone]    [employer]  [address] [place], [place] [address] [phone]    [employer]  [address] [place] [address]  [phone]                         [employer] Locations:     [address] [place] [address]   [phone]     [address] [place] [address] [address] [place] [address]   [phone]     [address] [place] [address]   [phone]     [address] [facility] [place] [address]   [phone]     [address] [place] [address]   [phone]     [address] [place] [address]   [phone]     [address] [facility] [address]   [place] [address]   [phone]     [address] [facility] [address]   [place] [address]   [phone]             How to find reasonable shoes          The correct width    Correct Fitting    Correct Length      Foot Distortion    Posture Distortion                          Torsional Rigidity      Grasp behind the heel and underneath the foot and twist      Bad    Excessive torsion/twist in midfoot     Less torsion/twist in midfoot is better                   Heel Counter Rigidity      Grasp just above   midsole and squeeze      Bad    Soft heel counter      Good    Rigid Heel Counter      Flexion Rigidity      Grasp shoe and bend from forefoot to rearfoot            Sturdy and reliable ankle braces are most readily  available on line, [employer], or [employer] [facility] and delivered for around $15-60.  Health insurance does not pay for this.    Double ankle strap or double ankle support is a good choice for acute ankle sprains or for compression, low volume and moderate stability.  Procare and Swede-O are common brands.          Procare lace up ankle brace or ASO ankle brace is a reasonable choice for long term support where compression is not desired.  However they get a bit bulkier.  These are intended for longer term use.    Consider:   Compression is desired with new injuries or if you have swelling  Ease of application  Volume of the brace and will it fit in your shoes    For recovery of an acute injury, avoid plastic stays on the side of the brace like the Aircast ankle stirrup as they are very bulky and do not fit in most shoes.  Avoid simple neoprene or compression sleeve. They are bulky and do not provide much compression or stability.     For recent ankle injuries we often use an ankle brace for compression and stability and mostly to prevent re-injury until the patient is able to regain strength and balance and able to perform one footed toe raise and one footed balance, equal bilateral.  Then discontinue its use as it can encourage the ankle to remain weak if used permanently.    For long term deformities and chronic instability the more rigid and thus bulkier braces are most often used to provide more help long term or help a ligament that has been elongated after multiple injuries.

## 2025-02-18 NOTE — PROGRESS NOTES
HPI:  Adelfo Fierro is a 54 year old male who is seen in consultation at the request of Sb Lugo PA-C    Pt presents for eval of:   (Onset, Location, L/R, Character, Treatments, Injury if yes)    XR Left and Right foot 2/18/2025     Onset late Jan 2025, anterior and dorsal Left > Right foot/ankle pain. Presents with Dude shoes  Constant swelling, dull ache. Intermittent stabbing, sharp, throbbing pain 2-9/10 that is worse walking down the stairs and walking. 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    Patient Active Problem List   Diagnosis    Essential hypertension    Allergic rhinitis       PAST MEDICAL HISTORY:   Past Medical History:   Diagnosis Date    Essential hypertension         PAST SURGICAL HISTORY: History reviewed. No pertinent surgical history.     MEDICATIONS:   Current Outpatient Medications:     diclofenac (VOLTAREN) 75 MG EC tablet, Take 1 tablet (75 mg) by mouth 2 times daily., Disp: 60 tablet, Rfl: 1    hydrOXYzine HCl (ATARAX) 25 MG tablet, Take 1 tablet (25 mg) by mouth 3 times daily as needed for anxiety (and sleep)., Disp: 60 tablet, Rfl: 3    lisinopril (ZESTRIL) 20 MG tablet, Take 1 tablet (20 mg) by mouth daily., Disp: 90 tablet, Rfl: 3    fluticasone (FLONASE) 50 MCG/ACT nasal spray, Spray 1 spray into both nostrils daily (Patient not taking: Reported on 2/18/2025), Disp: 16 g, Rfl: 11     ALLERGIES:    Allergies   Allergen Reactions    Other Environmental Allergy Other (See Comments)     Itchy, swollen, watery eyes.        SOCIAL HISTORY:   Social History     Socioeconomic History    Marital status:      Spouse name: Not on file    Number of children: Not on file    Years of education: Not on file    Highest education level: Not on file   Occupational History    Not on file   Tobacco Use    Smoking status:  Never    Smokeless tobacco: Former     Types: Chew     Quit date: 02/2024   Vaping Use    Vaping status: Never Used   Substance and Sexual Activity    Alcohol use: Yes     Alcohol/week: 14.0 standard drinks of alcohol    Drug use: Not on file    Sexual activity: Not on file   Other Topics Concern    Not on file   Social History Narrative    Not on file     Social Drivers of Health     Financial Resource Strain: Low Risk  (2/12/2025)    Financial Resource Strain     Within the past 12 months, have you or your family members you live with been unable to get utilities (heat, electricity) when it was really needed?: No   Food Insecurity: Low Risk  (2/12/2025)    Food Insecurity     Within the past 12 months, did you worry that your food would run out before you got money to buy more?: No     Within the past 12 months, did the food you bought just not last and you didn t have money to get more?: No   Transportation Needs: Low Risk  (2/12/2025)    Transportation Needs     Within the past 12 months, has lack of transportation kept you from medical appointments, getting your medicines, non-medical meetings or appointments, work, or from getting things that you need?: No   Physical Activity: Unknown (2/12/2025)    Exercise Vital Sign     Days of Exercise per Week: Patient declined     Minutes of Exercise per Session: 0 min   Stress: Stress Concern Present (2/12/2025)    [place] [employer]ational [employer] - Occupational Stress Questionnaire     Feeling of Stress : Rather much   Social Connections: Unknown (2/12/2025)    Social Connection and Isolation Panel [NHANES]     Frequency of Communication with Friends and Family: Not on file     Frequency of Social Gatherings with Friends and Family: Once a week     Attends Yazidi Services: Not on file     Active Member of Clubs or Organizations: Not on file     Attends Club or Organization Meetings: Not on file     Marital Status: Not on file   Interpersonal Safety: Low Risk  " (2025)    Interpersonal Safety     Do you feel physically and emotionally safe where you currently live?: Yes     Within the past 12 months, have you been hit, slapped, kicked or otherwise physically hurt by someone?: No     Within the past 12 months, have you been humiliated or emotionally abused in other ways by your partner or ex-partner?: No   Housing Stability: Low Risk  (2025)    Housing Stability     Do you have housing? : Yes     Are you worried about losing your housing?: No        FAMILY HISTORY:   Family History   Problem Relation Age of Onset    Hypertension Mother     Ovarian Cancer Mother     Hypertension Father     Prostate Cancer Father     Breast Cancer Maternal Grandmother     Heart Disease Paternal Grandfather 80        MI    Hypertension Paternal Grandfather     Cerebrovascular Disease Maternal Uncle 45                EXAM:Vitals: Ht 1.867 m (6' 1.5\")   Wt 88 kg (194 lb)   BMI 25.25 kg/m    BMI= Body mass index is 25.25 kg/m .    General appearance: Patient is alert and fully cooperative with history & exam.  No sign of distress is noted during the visit.     Psychiatric: Affect is pleasant & appropriate.  Patient appears motivated to improve health.     Respiratory: Breathing is regular & unlabored while sitting.     HEENT: Hearing is intact to spoken word.  Speech is clear.  No gross evidence of visual impairment that would impact ambulation.     Vascular: DP & PT pulses are intact & regular bilaterally.  No significant edema or varicosities noted.  CFT and skin temperature is normal to both lower extremities.     Neurologic: Lower extremity sensation is intact to light touch.  No evidence of weakness or contracture in the lower extremities.  No evidence of neuropathy.    Dermatologic: Skin is intact to both lower extremities with adequate texture, turgor and tone about the integument.  No paronychia or evidence of soft tissue infection is noted.     Musculoskeletal: Patient is " ambulatory without assistive device or brace.  Subtle crepitus is noted through the right and left ankle and subtalar joint rear foot of the left and right ankles.  Palpable induration noted about the left greater than right ankle but no erythema or heat.  Negative drawer but there is subtle crepitus through range of motion of the ankle and rear foot bilateral.    Radiographs: 3 views bilateral feet and ankles 2/18/2025 increased soft tissue density is noted about the left greater than right ankle.  Mild sclerotic change noted about the subtalar joint bilateral.     ASSESSMENT:       ICD-10-CM    1. Osteoarthritis of left ankle and foot  M19.072 diclofenac (VOLTAREN) 75 MG EC tablet     Orthotics, Mastectomy and Custom Compression Orders           PLAN:  Reviewed patient's chart in [facility].      2/18/2025   Recommend wearing supportive shoe gear.  He admits that when he is wearing shoe gear overall his feet feel better.  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  We discussed over shoes.  We discussed ankle bracing and oral anti-inflammatories.  Placed an order for custom molded orthotics.  Also begin oral anti-inflammatory Voltaren for 1 or 2 months then discontinue this and should not be continued long-term.  Do not utilize ibuprofen or Naprosyn in addition to this.  Follow-up in the next couple months if this remains symptomatic otherwise as needed.  Written instructions dispensed.      Adelfo Jules DPM

## 2025-02-20 ENCOUNTER — TELEPHONE (OUTPATIENT)
Dept: FAMILY MEDICINE | Facility: CLINIC | Age: 55
End: 2025-02-20
Payer: COMMERCIAL

## 2025-02-20 NOTE — TELEPHONE ENCOUNTER
Try to take the medication that was prescribed Voltaren twice daily.  You could take the first dose immediately upon arising and then take the second dose midway through the day.  You could also supplement this with Tylenol if needed.

## 2025-02-20 NOTE — TELEPHONE ENCOUNTER
Patient calling states he was seen by Dr Jules on 2/18/25 and was prescribed pain medication to take twice a day. Patient is reporting his pain is not well managed with twice a day regimen. Patient states he does not have foot pain unless he is walking or going up/down stairs. Patient states he has no pain at rest. 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 Patient is wondering if there is a medication he could take in the morning before going to work and then take mid-way through the day vs taking morning and night.      Disp Refills Start End JAKY   diclofenac (VOLTAREN) 75 MG EC tablet 60 tablet 1 2/18/2025 -- --   Sig - Route: Take 1 tablet (75 mg) by mouth 2 times daily. - Oral   Sent to pharmacy as: Diclofenac Sodium 75 MG Oral Tablet Delayed Release (VOLTAREN)       Writer did advise would send message to prescribing provider and nurse pool to call back patient. Patient verbalizes understanding and denies further questions at time of call.     Lynn Zacarias RN on 2/20/2025 at 9:22 AM

## 2025-02-20 NOTE — TELEPHONE ENCOUNTER
Medication recommendations relayed to patient. Patient verbalized understanding and had no further questions/concerns at this time.     Pebbles oCker RN   [facility]

## 2025-03-12 ENCOUNTER — MEDICAL CORRESPONDENCE (OUTPATIENT)
Dept: HEALTH INFORMATION MANAGEMENT | Facility: CLINIC | Age: 55
End: 2025-03-12
Payer: COMMERCIAL

## 2025-03-13 ENCOUNTER — TELEPHONE (OUTPATIENT)
Dept: FAMILY MEDICINE | Facility: CLINIC | Age: 55
End: 2025-03-13

## 2025-03-13 ENCOUNTER — LAB (OUTPATIENT)
Dept: LAB | Facility: CLINIC | Age: 55
End: 2025-03-13
Payer: COMMERCIAL

## 2025-03-13 DIAGNOSIS — E87.1 HYPONATREMIA: ICD-10-CM

## 2025-03-13 DIAGNOSIS — I10 ESSENTIAL HYPERTENSION: ICD-10-CM

## 2025-03-13 LAB
ANION GAP SERPL CALCULATED.3IONS-SCNC: 6 MMOL/L (ref 7–15)
BUN SERPL-MCNC: 15.3 MG/DL (ref 6–20)
CALCIUM SERPL-MCNC: 9.5 MG/DL (ref 8.8–10.4)
CHLORIDE SERPL-SCNC: 100 MMOL/L (ref 98–107)
CREAT SERPL-MCNC: 0.94 MG/DL (ref 0.67–1.17)
EGFRCR SERPLBLD CKD-EPI 2021: >90 ML/MIN/1.73M2
GLUCOSE SERPL-MCNC: 102 MG/DL (ref 70–99)
HCO3 SERPL-SCNC: 28 MMOL/L (ref 22–29)
POTASSIUM SERPL-SCNC: 4.9 MMOL/L (ref 3.4–5.3)
SODIUM SERPL-SCNC: 134 MMOL/L (ref 135–145)

## 2025-03-13 NOTE — TELEPHONE ENCOUNTER
Pt's blood pressure readings placed in [facility] in basket. Updated pt's chart with most recent reading.

## 2025-03-13 NOTE — TELEPHONE ENCOUNTER
Patient Quality Outreach    Patient is due for the following:   Hypertension -  BP check    Action(s) Taken:   Pt brought in current blood pressure readings    Type of outreach:    Documented pt's most recent bp reading    Questions for provider review:    None           Annmarie Gil CMA

## 2025-05-08 ENCOUNTER — OFFICE VISIT (OUTPATIENT)
Dept: SLEEP MEDICINE | Facility: CLINIC | Age: 55
End: 2025-05-08
Payer: COMMERCIAL

## 2025-05-08 VITALS
HEART RATE: 76 BPM | OXYGEN SATURATION: 98 % | SYSTOLIC BLOOD PRESSURE: 134 MMHG | WEIGHT: 194.6 LBS | DIASTOLIC BLOOD PRESSURE: 95 MMHG | BODY MASS INDEX: 24.97 KG/M2 | HEIGHT: 74 IN

## 2025-05-08 DIAGNOSIS — G47.00 INSOMNIA, UNSPECIFIED TYPE: ICD-10-CM

## 2025-05-08 DIAGNOSIS — I10 ESSENTIAL HYPERTENSION: ICD-10-CM

## 2025-05-08 DIAGNOSIS — R06.83 SNORING: ICD-10-CM

## 2025-05-08 DIAGNOSIS — R06.81 WITNESSED APNEIC SPELLS: ICD-10-CM

## 2025-05-08 DIAGNOSIS — R40.0 DAYTIME SOMNOLENCE: ICD-10-CM

## 2025-05-08 DIAGNOSIS — R29.818 SUSPECTED SLEEP APNEA: Primary | ICD-10-CM

## 2025-05-08 ASSESSMENT — SLEEP AND FATIGUE QUESTIONNAIRES
HOW LIKELY ARE YOU TO NOD OFF OR FALL ASLEEP WHILE WATCHING TV: HIGH CHANCE OF DOZING
HOW LIKELY ARE YOU TO NOD OFF OR FALL ASLEEP WHILE SITTING AND TALKING TO SOMEONE: WOULD NEVER DOZE
HOW LIKELY ARE YOU TO NOD OFF OR FALL ASLEEP WHILE LYING DOWN TO REST IN THE AFTERNOON WHEN CIRCUMSTANCES PERMIT: SLIGHT CHANCE OF DOZING
HOW LIKELY ARE YOU TO NOD OFF OR FALL ASLEEP WHEN YOU ARE A PASSENGER IN A CAR FOR AN HOUR WITHOUT A BREAK: WOULD NEVER DOZE
HOW LIKELY ARE YOU TO NOD OFF OR FALL ASLEEP IN A CAR, WHILE STOPPED FOR A FEW MINUTES IN TRAFFIC: WOULD NEVER DOZE
HOW LIKELY ARE YOU TO NOD OFF OR FALL ASLEEP WHILE SITTING INACTIVE IN A PUBLIC PLACE: WOULD NEVER DOZE
HOW LIKELY ARE YOU TO NOD OFF OR FALL ASLEEP WHILE SITTING QUIETLY AFTER LUNCH WITHOUT ALCOHOL: WOULD NEVER DOZE
HOW LIKELY ARE YOU TO NOD OFF OR FALL ASLEEP WHILE SITTING AND READING: SLIGHT CHANCE OF DOZING

## 2025-05-08 NOTE — PATIENT INSTRUCTIONS
Sleep hygiene guidelines    Recommendation Details   Regular bedtime and rise time Having a consistent bedtime and rise time leads to more regular sleep schedules and avoids periods of sleep deprivation or periods of extended wakefulness during the night.   Avoid napping Avoid napping, especially naps lasting longer than 1 hour and naps late in the day.   Limit caffeine Avoid caffeine after lunch. The time between lunch and bedtime represents approximately 2 half-lives for caffeine, and this time window allows for most caffeine to be metabolized before bedtime.   Limit alcohol Recommendations are typically focused on avoiding alcohol near bedtime. Alcohol is initially sedating, but activating as it is metabolized. Alcohol also negatively impacts sleep architecture.   Avoid nicotine Nicotine is a stimulant and should be avoided near bedtime and at night.   Exercise Daytime physical activity is encouraged, in particular, 4 to 6 hours before bedtime, as this may facilitate sleep onset. Rigorous exercise within 2 hours of bedtime is discouraged.   Keep the sleep environment quiet and dark Noise and light exposure during the night can disrupt sleep. White noise or ear plugs are often recommended to reduce noise. Using blackout shades or an eye mask is commonly recommended to reduce light.  This may also include avoiding exposure to television or technology near bedtime, as this can have an impact on circadian rhythms by shifting sleep timing later.   Bedroom clock Avoid checking the time at night. This includes alarm clocks and other time pieces (eg, watches and smart phones). Checking the time increases cognitive arousal and prolongs wakefulness.   Evening eating Avoid a large meal close to bedtime. Eat a healthy and filling (but not too heavy) meal in the early evening and avoid late-night snacks.                 MY TREATMENT INFORMATION FOR SLEEP APNEA-  Adelfo Fierro    DOCTOR : AGATHA Smith CNP    Am I  "having a sleep study at a sleep center?  --->Due to normal delays, you will be contacted within 2-4 weeks to schedule    Am I having a home sleep study?  --->Watch the video for the device you are using:    -/drop off device-   https://www.CloudFX.com/watch?v=yGGFBdELGhk    -Disposable device sent out require phone/computer application-   https://www.ActivityHeroube.com/watch?v=BCce_vbiwxE      Frequently asked questions:  1. What is Obstructive Sleep Apnea (RASHEL)? RASHEL is the most common type of sleep apnea. Apnea means, \"without breath.\"  Apnea is most often caused by narrowing or collapse of the upper airway as muscles relax during sleep.   Almost everyone has occasional apneas. Most people with sleep apnea have had brief interruptions at night frequently for many years.  The severity of sleep apnea is related to how frequent and severe the events are.   2. What are the consequences of RASHEL? Symptoms include: feeling sleepy during the day, snoring loudly, gasping or stopping of breathing, trouble sleeping, and occasionally morning headaches or heartburn at night.  Sleepiness can be serious and even increase the risk of falling asleep while driving. Other health consequences may include development of high blood pressure and other cardiovascular disease in persons who are susceptible. Untreated RASHEL  can contribute to heart disease, stroke and diabetes.   3. What are the treatment options? In most situations, sleep apnea is a lifelong disease that must be managed with daily therapy. Medications are not effective for sleep apnea and surgery is generally not considered until other therapies have been tried. Your treatment is your choice . Continuous Positive Airway (CPAP) works right away and is the therapy that is effective in nearly everyone. An oral device to hold your jaw forward is usually the next most reliable option. Other options include postioning devices (to keep you off your back), weight loss, and surgery " including a tongue pacing device. There is more detail about some of these options below.  4. Are my sleep studies covered by insurance? Although we will request verification of coverage, we advise you also check in advance of the study to ensure there is coverage.    Important tips for those choosing CPAP and similar devices  REMEMBER-IF YOU RECEIVE A CALL FROM  [phone]-->IT IS TO SETUP A DEVICE  For new devices, sign up for device AYESHA to monitor your device for your followup visits  We encourage you to utilize the [employer] ayesha or website ( https://Command Information/ ) to monitor your therapy progress and share the data with your healthcare team when you discuss your sleep apnea.                                                    Know your equipment:  CPAP is continuous positive airway pressure that prevents obstructive sleep apnea by keeping the throat from collapsing while you are sleeping. In most cases, the device is  smart  and can slowly self-adjusts if your throat collapses and keeps a record every day of how well you are treated-this information is available to you and your care team.  BPAP is bilevel positive airway pressure that keeps your throat open and also assists each breath with a pressure boost to maintain adequate breathing.  Special kinds of BPAP are used in patients who have inadequate breathing from lung or heart disease. In most cases, the device is  smart  and can slowly self-adjusts to assist breathing. Like CPAP, the device keeps a record of how well you are treated.  Your mask is your connection to the device. You get to choose what feels most comfortable and the staff will help to make sure if fits. Here: are some examples of the different masks that are available: Magnetic mask aids may assist with use but there are safety issues that should be addressed when considering with magnets* ( see end of discussion).       Key points to remember on your journey with sleep  apnea:  Sleep study.  PAP devices often need to be adjusted during a sleep study to show that they are effective and adjusted right.  Good tips to remember: Try wearing just the mask during a quiet time during the day so your body adapts to wearing it. A humidifier is recommended for comfort in most cases to prevent drying of your nose and throat. Allergy medication from your provider may help you if you are having nasal congestion.  Getting settled-in. It takes more than one night for most of us to get used to wearing a mask. Try wearing just the mask during a quiet time during the day so your body adapts to wearing it. A humidifier is recommended for comfort in most cases. Our team will work with you carefully on the first day and will be in contact within 4 days and again at 2 and 4 weeks for advice and remote device adjustments. Your therapy is evaluated by the device each day.   Use it every night. The more you are able to sleep naturally for 7-8 hours, the more likely you will have good sleep and to prevent health risks or symptoms from sleep apnea. Even if you use it 4 hours it helps. Occasionally all of us are unable to use a medical therapy, in sleep apnea, it is not dangerous to miss one night.   Communicate. Call our skilled team on the number provided on the first day if your visit for problems that make it difficult to wear the device. Over 2 out of 3 patients can learn to wear the device long-term with help from our team. Remember to call our team or your sleep providers if you are unable to wear the device as we may have other solutions for those who cannot adapt to mask CPAP therapy. It is recommended that you sleep your sleep provider within the first 3 months and yearly after that if you are not having problems.   Use it for your health. We encourage use of CPAP masks during daytime quiet periods to allow your face and brain to adapt to the sensation of CPAP so that it will be a more natural  sensation to awaken to at night or during naps. This can be very useful during the first few weeks or months of adapting to CPAP though it does not help medically to wear CPAP during wakefulness and  should not be used as a strategy just to meet guidelines.  Take care of your equipment. Make sure you clean your mask and tubing using directions every day and that your filter and mask are replaced as recommended or if they are not working.     *Masks with magnets:  Updated Contraindications  Masks with magnetic components are contraindicated for use by patients where they, or anyone in close physical contact while using the mask, have the following:   Active medical implants that interact with magnets (i.e., pacemakers, implantable cardioverter defibrillators (ICD), neurostimulators, cerebrospinal fluid (CSF) shunts, insulin/infusion pumps)   Metallic implants/objects containing ferromagnetic material (i.e., aneurysm clips/flow disruption devices, embolic coils, stents, valves, electrodes, implants to restore hearing or balance with implanted magnets, ocular implants, metallic splinters in the eye)  Updated Warning  Keep the mask magnets at a safe distance of at least 6 inches (150 mm) away from implants or medical devices that may be adversely affected by magnetic interference. This warning applies to you or anyone in close physical contact with your mask. The magnets are in the frame and lower headgear clips, with a magnetic field strength of up to 400mT. When worn, they connect to secure the mask but may inadvertently detach while asleep.  Implants/medical devices, including those listed within contraindications, may be adversely affected if they change function under external magnetic fields or contain ferromagnetic materials that attract/repel to magnetic fields (some metallic implants, e.g., contact lenses with metal, dental implants, metallic cranial plates, screws, abel hole covers, and bone substitute  devices). Consult your physician and  of your implant / other medical device for information on the potential adverse effects of magnetic fields.    BESIDES CPAP, WHAT OTHER THERAPIES ARE THERE?    Positioning Device  Positioning devices are generally used when sleep apnea is mild and only occurs on your back.This example shows a pillow that straps around the waist. It may be appropriate for those whose sleep study shows milder sleep apnea that occurs primarily when lying flat on one's back. Preliminary studies have shown benefit but effectiveness at home may need to be verified by a home sleep test. These devices are generally not covered by medical insurance.  Examples of devices that maintain sleeping on the back to prevent snoring and mild sleep apnea.    Belt type body positioner  http://ParStream/    Electronic reminder  http://nightshifttherapy.com/            Oral Appliance  What is oral appliance therapy?  An oral appliance device fits on your teeth at night like a retainer used after having braces. The device is made by a specialized dentist and requires several visits over 1-2 months before a manufactured device is made to fit your teeth and is adjusted to prevent your sleep apnea. Once an oral device is working properly, snoring should be improved. A home sleep test may be recommended at that time if to determine whether the sleep apnea is adequately treated.       Some things to remember:  -Oral devices are often, but not always, covered by your medical insurance. Be sure to check with your insurance provider.   -If you are referred for oral therapy, you will be given a list of specialized dentists to consider or you may choose to visit the Web site of the American Academy of Dental Sleep Medicine  -Oral devices are less likely to work if you have severe sleep apnea or are extremely overweight.     More detailed information  An oral appliance is a small acrylic device that fits over the  upper and lower teeth  (similar to a retainer or a mouth guard). This device slightly moves jaw forward, which moves the base of the tongue forward, opens the airway, improves breathing for effective treat snoring and obstructive sleep apnea in perhaps 7 out of 10 people .  The best working devices are custom-made by a dental device  after a mold is made of the teeth 1, 2, 3.  When is an oral appliance indicated?  Oral appliance therapy is recommended as a first-line treatment for patients with primary snoring, mild sleep apnea, and for patients with moderate sleep apnea who prefer appliance therapy to use of CPAP4, 5. Severity of sleep apnea is determined by sleep testing and is based on the number of respiratory events per hour of sleep.   How successful is oral appliance therapy?  The success rate of oral appliance therapy in patients with mild sleep apnea is 75-80% while in patients with moderate sleep apnea it is 50-70%. The chance of success in patients with severe sleep apnea is 40-50%. The research also shows that oral appliances have a beneficial effect on the cardiovascular health of RASHEL patients at the same magnitude as CPAP therapy7.  Oral appliances should be a second-line treatment in cases of severe sleep apnea, but if not completely successful then a combination therapy utilizing CPAP plus oral appliance therapy may be effective. Oral appliances tend to be effective in a broad range of patients although studies show that the patients who have the highest success are females, younger patients, those with milder disease, and less severe obesity. 3, 6.   Finding a dentist that practices dental sleep medicine  Specific training is available through the American Academy of Dental Sleep Medicine for dentists interested in working in the field of sleep. To find a dentist who is educated in the field of sleep and the use of oral appliances, near you, visit the Web site of the American Academy of  Dental Sleep Medicine.    References  1. Segundo et al. Objectively measured vs self-reported compliance during oral appliance therapy for sleep-disordered breathing. Chest 2013; 144(5): 2515-4267.  2. Rl et al. Objective measurement of compliance during oral appliance therapy for sleep-disordered breathing. Thorax 2013; 68(1): 91-96.  3. Migue, et al. Mandibular advancement devices in 620 men and women with RASHEL and snoring: tolerability and predictors of treatment success. Chest 2004; 125: 3647-4439.  4. Jimbo et al. Oral appliances for snoring and RASHEL: a review. Sleep 2006; 29: 244-262.  5. Loco et al. Oral appliance treatment for RASHEL: an update. J Clin Sleep Med 2014; 10(2): 215-227.  6. Ernesto et al. Predictors of OSAH treatment outcome. J Dent Res 2007; 86: 6397-2126.      Weight Loss:   Your Body mass index is 25.32 kg/m .    Being overweight does not necessarily mean you will have health consequences.  Those who have BMI over 30 or over 27 with existing medical conditions carries greater risk.   Weight loss decreases severity of sleep apnea in most people with obesity. For those with mild obesity who have developed snoring with weight gain, even 15-30 pound weight loss can improve and occasionally milder eliminate sleep apnea.  Structured and life-long dietary and health habits are necessary to lose weight and keep healthier weight levels.     The Comprehensive Weight loss program offers all aspects of weight loss strategies including two Non-Surgical Weight Loss Programs: Medical Weight Management and our 24 Week Healthy Lifestyle Program:  Medical Weight Management: You will meet with a Medical Weight Management Provider, as well as a Registered Dietician. The program may include medication therapy, dietary education, recommended exercise and physical therapy programs, monthly support group meetings, and possible psychological counseling. Follow up visits with the provider or  dietician are scheduled based on your progress and needs.  24 Week Healthy Lifestyle Program: This unique program is designed to give you the support of weekly appointments and activities thru a 24-week period. It may include all of the components of the basic program (above), with the addition of 11 individual Health  Visits, 24-week access to the [employer] website for over 700 online classes, and monthly support group meetings. This program has an out-of-pocket expense of $499 to cover the items that can not be billed to insurance (health coaches and [employer] access), and is non-refundable/non-transferable (you may be able to use a Health Savings Account; ask your HSA provider). There may be an optional meal replacement plan prescribed as well.   Medication therapy has been approved for the treatment of sleep apnea: The FDA approved tirzepatide (ZEPBOUND) for moderate to severe sleep apnea (apnea-hypopnea index greater than or equal to 15) in patients with BMI of greater than or equal to 30, or BMI greater than or equal to 27 with at least 1 weight-related condition such as hypertension or dyslipidemia.  Surgical management achieves meaningful long-term weight loss and improvement in health risks in most patients with more severe obesity.      Sleep Apnea Surgery:    Surgery for obstructive sleep apnea is considered generally only when other therapies fail to work. Surgery may be discussed with you if you are having a difficult time tolerating CPAP and or when there is an abnormal structure that requires surgical correction.  Nose and throat surgeries often enlarge the airway to prevent collapse.  Most of these surgeries create pain for 1-2 weeks and up to half of the most common surgeries are not effective throughout life.  You should carefully discuss the benefits and drawbacks to surgery with your sleep provider and surgeon to determine if it is the best solution for you.   More information  Surgery for  RASHEL is directed at areas that are responsible for narrowing or complete obstruction of the airway during sleep.  There are a wide range of procedures available to enlarge and/or stabilize the airway to prevent blockage of breathing in the three major areas where it can occur: the palate, tongue, and nasal regions.  Successful surgical treatment depends on the accurate identification of the factors responsible for obstructive sleep apnea in each person.  A personalized approach is required because there is no single treatment that works well for everyone.  Because of anatomic variation, consultation with an examination by a sleep surgeon is a critical first step in determining what surgical options are best for each patient.  In some cases, examination during sedation may be recommended in order to guide the selection of procedures.  Patients will be counseled about risks and benefits as well as the typical recovery course after surgery. Surgery is typically not a cure for a person s RASHEL.  However, surgery will often significantly improve one s RASHEL severity (termed  success rate ).  Even in the absence of a cure, surgery will decrease the cardiovascular risk associated with OSA7; improve overall quality of life8 (sleepiness, functionality, sleep quality, etc).      Palate Procedures:  Patients with RASHEL often have narrowing of their airway in the region of their tonsils and uvula.  The goals of palate procedures are to widen the airway in this region as well as to help the tissues resist collapse.  Modern palate procedure techniques focus on tissue conservation and soft tissue rearrangement, rather than tissue removal.  Often the uvula is preserved in this procedure. Residual sleep apnea is common in patient after pharyngoplasty with an average reduction in sleep apnea events of 33%2.      Tongue Procedures:  ExamWhile patients are awake, the muscles that surround the throat are active and keep this region open for  breathing. These muscles relax during sleep, allowing the tongue and other structures to collapse and block breathing.  There are several different tongue procedures available.  Selection of a tongue base procedure depends on characteristics seen on physical exam.  Generally, procedures are aimed at removing bulky tissues in this area or preventing the back of the tongue from falling back during sleep.  Success rates for tongue surgery range from 50-62%3.    Hypoglossal Nerve Stimulation:  Hypoglossal nerve stimulation has recently received approval from the [place] Food and Drug Administration for the treatment of obstructive sleep apnea.  This is based on research showing that the system was safe and effective in treating sleep apnea6.  Results showed that the median AHI score decreased 68%, from 29.3 to 9.0. This therapy uses an implant system that senses breathing patterns and delivers mild stimulation to airway muscles, which keeps the airway open during sleep.  The system consists of three fully implanted components: a small generator (similar in size to a pacemaker), a breathing sensor, and a stimulation lead.  Using a small handheld remote, a patient turns the therapy on before bed and off upon awakening.    Candidates for this device must be greater than 18 years of age, have moderate to severe obstructive sleep apnea with less than 25% central events  (AHI between 15-65), BMI less than 35, have tried CPAP/oral appliance for at least 8 weeks without success, and have appropriate upper airway anatomy (determined by a sleep endoscopy performed by Dr. Geovanni Ramírez or Dr. Pramod Rios).     Nasal Procedures:  Nasal obstruction can interfere with nasal breathing during the day and night.  Studies have shown that relief of nasal obstruction can improve the ability of some patients to tolerate positive airway pressure therapy for obstructive sleep apnea1.  Treatment options include medications such as nasal  saline, topical corticosteroid and antihistamine sprays, and oral medications such as antihistamines or decongestants. Non-surgical treatments can include external nasal dilators for selected patients. If these are not successful by themselves, surgery can improve the nasal airway either alone or in combination with these other options.        Combination Procedures:  Combination of surgical procedures and other treatments may be recommended, particularly if patients have more than one area of narrowing or persistent positional disease.  The success rate of combination surgery ranges from 66-80%2,3.    References  Ju REECE. The Role of the Nose in Snoring and Obstructive Sleep Apnoea: An Update.  Eur Arch Otorhinolaryngol. 2011; 268: 1365-73.   Genevieve SM; Vince JA; Panchito JR; Pallanch JF; Carolina MB; Nico SG; Qiana ABRAHAM. Surgical modifications of the upper airway for obstructive sleep apnea in adults: a systematic review and meta-analysis. SLEEP 2010;33(10):6308-5798. Yannick PATEL. Hypopharyngeal surgery in obstructive sleep apnea: an evidence-based medicine review.  Arch Otolaryngol Head Neck Surg. 2006 Feb;132(2):206-13.  Cayden YH1, Roxane Y, Tyler CHRISTIANO. The efficacy of anatomically based multilevel surgery for obstructive sleep apnea. Otolaryngol Head Neck Surg. 2003 Oct;129(4):327-35.  Kezirian E, Goldberg A. Hypopharyngeal Surgery in Obstructive Sleep Apnea: An Evidence-Based Medicine Review. Arch Otolaryngol Head Neck Surg. 2006 Feb;132(2):206-13.  Juan ADAMS et al. Upper-Airway Stimulation for Obstructive Sleep Apnea.  N Engl J Med. 2014 Jan 9;370(2):139-49.  Pemurphy Y et al. Increased Incidence of Cardiovascular Disease in Middle-aged Men with Obstructive Sleep Apnea. Am J Respir Crit Care Med; 2002 166: 159-165  Toño FERREIRA et al. Studying Life Effects and Effectiveness of Palatopharyngoplasty (SLEEP) study: Subjective Outcomes of Isolated Uvulopalatopharyngoplasty. Otolaryngol Head Neck Surg. 2011; 144:  550-098.        WHAT IF I ONLY HAVE SNORING?    Mandibular advancement devices, lateral sleep positioning, long-term weight loss and treatment of nasal allergies have been shown to improve snoring.  Exercising tongue muscles with a game (https://IncentOne.Humagade/us/ayesha/Prieto Battery-reduce-snoring/md2876851936) or stimulating the tongue during the day with a device (https://doi.org/10.3390/fbi67997106) have improved snoring in some individuals.  https://www.SEA.Rayneer/  https://www.sleepfoundation.org/best-anti-snoring-mouthpieces-and-mouthguards    Remember to Drive Safe... Drive Alive     Sleep health profoundly affects your health, mood, and your safety.  Thirty three percent of the population (one in three of us) is not getting enough sleep and many have a sleep disorder. Not getting enough sleep or having an untreated / undertreated sleep condition may make us sleepy without even knowing it. In fact, our driving could be dramatically impaired due to our sleep health. As your provider, here are some things I would like you to know about driving:     Here are some warning signs for impairment and dangerous drowsy driving:              -Having been awake more than 16 hours               -Looking tired               -Eyelid drooping              -Head nodding (it could be too late at this point)              -Driving for more than 30 minutes     Some things you could do to make the driving safer if you are experiencing some drowsiness:              -Stop driving and rest              -Call for transportation              -Make sure your sleep disorder is adequately treated     Some things that have been shown NOT to work when experiencing drowsiness while driving:              -Turning on the radio              -Opening windows              -Eating any  distracting  /  entertaining  foods (e.g., sunflower seeds, candy, or any other)              -Talking on the phone      Your decision may not only impact your life,  but also the life of others. Please, remember to drive safe for yourself and all of us.

## 2025-05-08 NOTE — PROGRESS NOTES
Outpatient Sleep Medicine Consultation:      Name: Adelfo Fierro MRN# [id number]   Age: 55 year old YOB: 1970     Date of Consultation: May 8, 2025  Consultation is requested by: AGATHA Mendosa CNP  [address]  [place] [address] Antwon Subramanian  Primary care provider: Sb Lugo       Reason for Sleep Consult:     Adelfo Fierro is sent by Antwon Subramanian for a sleep consultation regarding snoring, witnessed apneas, HTN, possible RASHEL.    Patient s Reason for visit  Adelfo Fierro main reason for visit: (Patient-Rptd) snoring,wakeing up, wife says I stop breathing sometimes  Patient states problem(s) started: (Patient-Rptd) ?  Adelfo Fierro's goals for this visit: (Patient-Rptd) would like to sleep through the night           Assessment and Plan:     Summary Sleep Diagnoses:  1. Suspected sleep apnea (Primary)  2. Snoring  3. Witnessed apneic spells  4. Daytime somnolence  5. Insomnia, unspecified type  6. Essential hypertension  - Adult Sleep Eval & Management  Referral  - HST-Home Sleep Apnea Test - Noxturnal Returnable; Future      Comorbid Diagnoses:  HTN  Allergic rhinitis      Summary Recommendations:  Recommend further evaluation with home sleep apnea testing (HST) for possible sleep disordered breathing.  He has a high pretest probability of RASHEL with symptoms of snoring, snorting self awake, witnessed apneas, occasional daytime somnolence, difficulty staying asleep, difficulty getting back to sleep once awakened, morning mouth dryness, heartburn/reflux at night, elevated blood pressures, getting up to bathroom more than once at times, and occasional nonrestorative sleep for several years.  His STOP-BANG score is 7 today which suggests a high risk of RASHEL.  His symptoms are consistent with possible obstructive sleep apnea.  We discussed the pathophysiology of obstructive sleep apnea and the risks associated with untreated RASHEL.  We also discussed the  pros and cons of in lab polysomnography versus home sleep testing.  The patient has elected to undergo home sleep testing (HST) to further evaluate his symptoms of possible obstructive sleep apnea.  All potential therapeutic options including positive airway pressure, mandibular advancing oral appliances, positional therapy, and surgical options were discussed. Also counseled about impact of weight loss on RASHEL.   His insomnia severity index is 20 today which is consistent with moderate severity clinical insomnia.  This appears to be multifactorial and is associated with elements of poor sleep hygiene, anxiety, and active mind at bedtime or upon waking, work stressors, and likely sleep disordered breathing contributing.  Information was discussed briefly and provided in the AVS regarding sleep hygiene guidelines for his review.  Additionally, consideration for cognitive behavioral therapy for insomnia (CBT-I) was also discussed but can be further reviewed at follow-up.  Follow-up in approximately 3 months to review sleep study results and to determine next steps.    Orders Placed This Encounter   Procedures    HST-Home Sleep Apnea Test - Noxturnal Returnable         Summary Counseling:    Sleep Testing Reviewed  Obstructive Sleep Apnea Reviewed  Complications of Untreated Sleep Apnea Reviewed  Previous recent chart notes and lab results reviewed    Medical Decision-making:   Educational materials provided in instructions    Total time spent reviewing medical records, history and physical examination, review of previous testing and interpretation as well as documentation on this date: 32 minutes    CC: Antwon Subramanian          History of Present Illness:     Adelfo Fierro is a 55-year-old male with PMH significant for allergic rhinitis and HTN who presents today with symptoms of snoring, snorting self awake, witnessed apneas, occasional daytime somnolence, difficulty staying asleep, difficulty getting back to sleep  once awakened, morning mouth dryness, heartburn/reflux at night, elevated blood pressures, getting up to bathroom more than once at times, and occasional nonrestorative sleep for several years.  He was referred by his primary care provider for further evaluation of possible sleep disordered breathing.    Past Sleep Evaluations: No    SLEEP-WAKE SCHEDULE:     Work/School Days: Patient goes to school/work: (Patient-Rptd) No   Usually gets into bed at (Patient-Rptd) 830-930 Later on the weekends  Takes patient about (Patient-Rptd) as soon as I hit the pillow to fall asleep  Has trouble falling asleep (Patient-Rptd) 0 nights per week  Wakes up in the middle of the night (Patient-Rptd) 3 to 4 times.  Wakes up due to (Patient-Rptd) Snorting self awake;Pain;Use the bathroom;Anxiety  He has trouble falling back asleep (Patient-Rptd) 4 -5 times a week.   It usually takes (Patient-Rptd) depends to get back to sleep  Patient is usually up at (Patient-Rptd) 430am  Uses alarm: (Patient-Rptd) Yes    Weekends/Non-work Days/All Other Days:  Usually gets into bed at (Patient-Rptd) 10-11   Takes patient about (Patient-Rptd) not long to fall asleep  Patient is usually up at (Patient-Rptd) 6-8  Uses alarm: (Patient-Rptd) No    Sleep Need  Patient gets  (Patient-Rptd) 5-6 sleep on average   Patient thinks he needs about (Patient-Rptd) more than that sleep    Adelfo Fierro prefers to sleep in this position(s): (Patient-Rptd) Back;Side   Patient states they do the following activities in bed:      Naps  Patient takes a purposeful nap (Patient-Rptd) weekends sometimes times a week and naps are usually (Patient-Rptd) 30 in duration  He feels better after a nap: (Patient-Rptd) Yes  He dozes off unintentionally (Patient-Rptd) 3 to 4 days per week  Patient has had a driving accident or near-miss due to sleepiness/drowsiness: (Patient-Rptd) No      SLEEP DISRUPTIONS:    Breathing/Snoring  Patient snores:(Patient-Rptd) Yes  Other people  complain about his snoring: (Patient-Rptd) Yes  Patient has been told he stops breathing in his sleep:(Patient-Rptd) Yes  He has issues with the following: (Patient-Rptd) Morning mouth dryness;Heartburn or reflux at night    Movement:  Patient gets pain, discomfort, with an urge to move:  (Patient-Rptd) No  It happens when he is resting:  (Patient-Rptd) No  It happens more at night:  (Patient-Rptd) No  Patient has been told he kicks his legs at night:  (Patient-Rptd) No     Behaviors in Sleep:  Adelfo Fierro has experienced the following behaviors while sleeping:    He has experienced sudden muscle weakness during the day: (Patient-Rptd) No      Is there anything else you would like your sleep provider to know:        CAFFEINE AND OTHER SUBSTANCES:    Patient consumes caffeinated beverages per day:  (Patient-Rptd) 1  Last caffeine use is usually: (Patient-Rptd) 8  List of any prescribed or over the counter stimulants that patient takes:    List of any prescribed or over the counter sleep medication patient takes:    List of previous sleep medications that patient has tried:    Patient drinks alcohol to help them sleep: (Patient-Rptd) No  Patient drinks alcohol near bedtime: (Patient-Rptd) No    Family History:  Patient has a family member been diagnosed with a sleep disorder: (Patient-Rptd) No            SCALES:    EPWORTH SLEEPINESS SCALE         5/8/2025     8:23 AM    Perkins Sleepiness Scale ( GABI Walker  [id number]<br>ESS - [place]/English - Final version - 21 Nov 07 - [facility] [employer].)   Sitting and reading Slight chance of dozing   Watching TV High chance of dozing   Sitting, inactive in a public place (e.g. a theatre or a meeting) Would never doze   As a passenger in a car for an hour without a break Would never doze   Lying down to rest in the afternoon when circumstances permit Slight chance of dozing   Sitting and talking to someone Would never doze   Sitting quietly after a lunch without alcohol  Would never doze   In a car, while stopped for a few minutes in traffic Would never doze   Somers Score (MC) 5   Somers Score (Sleep) 5        Patient-reported         INSOMNIA SEVERITY INDEX (OSCAR)          5/8/2025     7:55 AM   Insomnia Severity Index (OSCAR)   Difficulty falling asleep 0   Difficulty staying asleep 3   Problems waking up too early 3   How SATISFIED/DISSATISFIED are you with your CURRENT sleep pattern? 4   How NOTICEABLE to others do you think your sleep problem is in terms of impairing the quality of your life? 4   How WORRIED/DISTRESSED are you about your current sleep problem? 3   To what extent do you consider your sleep problem to INTERFERE with your daily functioning (e.g. daytime fatigue, mood, ability to function at work/daily chores, concentration, memory, mood, etc.) CURRENTLY? 3   OSCAR Total Score 20        Patient-reported       Guidelines for Scoring/Interpretation:  Total score categories:  0-7 = No clinically significant insomnia   8-14 = Subthreshold insomnia   15-21 = Clinical insomnia (moderate severity)  22-28 = Clinical insomnia (severe)  Used via courtesy of www.[employer].va.gov with permission from Fred Sarmiento PhD., [facility]      STOP BANG score: 7        5/8/2025     8:24 AM   STOP BANG Questionnaire (  2008, the American Society of Anesthesiologists, Inc. [employer]   1. Snoring - Do you snore loudly (louder than talking or loud enough to be heard through closed doors)? Yes   2. Tired - Do you often feel tired, fatigued, or sleepy during daytime? Yes   3. Observed - Has anyone observed you stop breathing during your sleep? Yes   4. Blood pressure - Do you have or are you being treated for high blood pressure? Yes   5. BMI - BMI more than 35 kg/m2? No   6. Age - Age over 50 yr old? Yes   7. Neck circumference - Neck circumference greater than 40 cm? Yes   8. Gender - Gender male? Yes   STOP BANG Score (MC): 7 (High risk of RASHEL)  "        GAD7        2/12/2025     2:48 PM   KENN-7    1. Feeling nervous, anxious, or on edge 2   2. Not being able to stop or control worrying 0   3. Worrying too much about different things 2   4. Trouble relaxing 2   5. Being so restless that it is hard to sit still 1   6. Becoming easily annoyed or irritable 0   7. Feeling afraid, as if something awful might happen 0   KENN-7 Total Score 7   If you checked any problems, how difficult have they made it for you to do your work, take care of things at home, or get along with other people? Not difficult at all         CAGE-AID         No data to display                CAGE-AID reprinted with permission from the [place] Medical Journal, KAYLAH Jung. and MARIA VICTORIA Herr, \"Conjoint screening questionnaires for alcohol and drug abuse\" [place] Medical Journal 94: 135-140, 1995.      PATIENT HEALTH QUESTIONNAIRE-9 (PHQ - 9)        1/4/2022     9:00 AM   PHQ-9 ([employer])   1.  Little interest or pleasure in doing things 0   2.  Feeling down, depressed, or hopeless 0   3.  Trouble falling or staying asleep, or sleeping too much 0   4.  Feeling tired or having little energy 1   5.  Poor appetite or overeating 0   6.  Feeling bad about yourself - or that you are a failure or have let yourself or your family down 0   7.  Trouble concentrating on things, such as reading the newspaper or watching television 0   8.  Moving or speaking so slowly that other people could have noticed. Or the opposite - being so fidgety or restless that you have been moving around a lot more than usual 0   9.  Thoughts that you would be better off dead, or of hurting yourself in some way 0   PHQ-9 Total Score 1   If you checked off any problems, how difficult have these problems made it for you to do your work, take care of things at home, or get along with other people? Not difficult at all   6.  Feeling bad about yourself 0   7.  Trouble concentrating 0   8.  Moving slowly or restless 0   9.  Suicidal or " self-harm thoughts 0   Difficulty at work, home, or with people Not difficult at all       Developed by [employer], Danisha Munson, Germain Matthew and colleagues, with an educational zunilda from [employer] No permission required to reproduce, translate, display or distribute.        Allergies:    Allergies   Allergen Reactions    Other Environmental Allergy Other (See Comments)     Itchy, swollen, watery eyes.       Medications:    Current Outpatient Medications   Medication Sig Dispense Refill    diclofenac (VOLTAREN) 75 MG EC tablet Take 1 tablet (75 mg) by mouth 2 times daily. 60 tablet 1    fluticasone (FLONASE) 50 MCG/ACT nasal spray Spray 1 spray into both nostrils daily 16 g 11    hydrOXYzine HCl (ATARAX) 25 MG tablet Take 1 tablet (25 mg) by mouth 3 times daily as needed for anxiety (and sleep). 60 tablet 3    lisinopril (ZESTRIL) 20 MG tablet Take 1 tablet (20 mg) by mouth daily. 90 tablet 3       Problem List:  Patient Active Problem List    Diagnosis Date Noted    Allergic rhinitis 10/12/2017     Priority: Medium    Essential hypertension 07/01/2016     Priority: Medium        Past Medical/Surgical History:  Past Medical History:   Diagnosis Date    Essential hypertension      No past surgical history on file.    Social History:  Social History     Socioeconomic History    Marital status:      Spouse name: Not on file    Number of children: Not on file    Years of education: Not on file    Highest education level: Not on file   Occupational History    Not on file   Tobacco Use    Smoking status: Never    Smokeless tobacco: Former     Types: Chew     Quit date: 02/2024   Vaping Use    Vaping status: Never Used   Substance and Sexual Activity    Alcohol use: Yes     Alcohol/week: 14.0 standard drinks of alcohol    Drug use: Not on file    Sexual activity: Not on file   Other Topics Concern    Not on file   Social History Narrative    Not on file     Social Drivers of Health      Financial Resource Strain: Low Risk  (2/12/2025)    Financial Resource Strain     Within the past 12 months, have you or your family members you live with been unable to get utilities (heat, electricity) when it was really needed?: No   Food Insecurity: Low Risk  (2/12/2025)    Food Insecurity     Within the past 12 months, did you worry that your food would run out before you got money to buy more?: No     Within the past 12 months, did the food you bought just not last and you didn t have money to get more?: No   Transportation Needs: Low Risk  (2/12/2025)    Transportation Needs     Within the past 12 months, has lack of transportation kept you from medical appointments, getting your medicines, non-medical meetings or appointments, work, or from getting things that you need?: No   Physical Activity: Unknown (2/12/2025)    Exercise Vital Sign     Days of Exercise per Week: Patient declined     Minutes of Exercise per Session: 0 min   Stress: Stress Concern Present (2/12/2025)    [employer] [place] [employer] - Occupational Stress Questionnaire     Feeling of Stress : Rather much   Social Connections: Unknown (2/12/2025)    Social Connection and Isolation Panel [NHANES]     Frequency of Communication with Friends and Family: Not on file     Frequency of Social Gatherings with Friends and Family: Once a week     Attends [employer] Services: Not on file     Active Member of Clubs or Organizations: Not on file     Attends Club or Organization Meetings: Not on file     Marital Status: Not on file   Interpersonal Safety: Low Risk  (2/12/2025)    Interpersonal Safety     Do you feel physically and emotionally safe where you currently live?: Yes     Within the past 12 months, have you been hit, slapped, kicked or otherwise physically hurt by someone?: No     Within the past 12 months, have you been humiliated or emotionally abused in other ways by your partner or ex-partner?: No   Housing Stability: Low Risk  " (2025)    Housing Stability     Do you have housing? : Yes     Are you worried about losing your housing?: No       Family History:  Family History   Problem Relation Age of Onset    Hypertension Mother     Ovarian Cancer Mother     Hypertension Father     Prostate Cancer Father     Breast Cancer Maternal Grandmother     Heart Disease Paternal Grandfather 80        MI    Hypertension Paternal Grandfather     Cerebrovascular Disease Maternal Uncle 45               Review of Systems:  A complete review of systems reviewed by me is negative with the exeption of what has been mentioned in the history of present illness.  In the last TWO WEEKS have you experienced any of the following symptoms?  Fevers: (Patient-Rptd) No  Night Sweats: (Patient-Rptd) No  Weight Gain: (Patient-Rptd) No  Pain at Night: (Patient-Rptd) No  Double Vision: (Patient-Rptd) No  Changes in Vision: (Patient-Rptd) No  Difficulty Breathing through Nose: (Patient-Rptd) No  Sore Throat in Morning: (Patient-Rptd) No  Dry Mouth in the Morning: (Patient-Rptd) No  Shortness of Breath Lying Flat: (Patient-Rptd) No  Shortness of Breath With Activity: (Patient-Rptd) No  Awakening with Shortness of Breath: (Patient-Rptd) No  Increased Cough: (Patient-Rptd) Yes  Heart Racing at Night: (Patient-Rptd) No  Swelling in Feet or Legs: (Patient-Rptd) No  Diarrhea at Night: (Patient-Rptd) No  Heartburn at Night: (Patient-Rptd) No  Urinating More than Once at Night: (Patient-Rptd) Yes  Losing Control of Urine at Night: (Patient-Rptd) No  Joint Pains at Night: (Patient-Rptd) Yes  Headaches in Morning: (Patient-Rptd) No  Weakness in Arms or Legs: (Patient-Rptd) No  Depressed Mood: (Patient-Rptd) No  Anxiety: (Patient-Rptd) Yes     Physical Examination:  Vitals: BP (!) 134/95   Pulse 76   Ht 1.867 m (6' 1.5\")   Wt 88.3 kg (194 lb 9.6 oz)   SpO2 98%   BMI 25.32 kg/m    BMI= Body mass index is 25.32 kg/m .    Neck Cir (cm): 40 cm      GENERAL APPEARANCE: " "healthy, alert, no distress, and cooperative  EYES: Eyes grossly normal to inspection, wearing eyeglasses  RESP: Unlabored, easy breathing with normal conversational speech  CV: color normal  NEURO: Alert and oriented x 3, normal strength and tone, mentation intact, and speech normal  PSYCH: mentation appears normal and affect normal/bright  Mallampati Class: Deferred examination  Tonsillar Stage: Deferred examination           Data: All pertinent previous laboratory data reviewed     Recent Labs   Lab Test 03/13/25  0715 02/12/25  1509   * 133*   POTASSIUM 4.9 3.9   CHLORIDE 100 95*   CO2 28 22   ANIONGAP 6* 16*   * 86   BUN 15.3 19.7   CR 0.94 0.92    9.5 9.6       Recent Labs   Lab Test 07/12/24  0829   WBC 5.2   RBC 4.90   HGB 15.4   HCT 43.8   MCV 89   MCH 31.4   MCHC 35.2   RDW 11.8          Recent Labs   Lab Test 07/12/24  0829   PROTTOTAL 7.5   ALBUMIN 4.5   BILITOTAL 0.5   ALKPHOS 100   AST 29   ALT 39       No results found for: \"TSH\"    No results found for: \"UAMP\", \"UBARB\", \"BENZODIAZEUR\", \"UCANN\", \"UCOC\", \"OPIT\", \"UPCP\"    No results found for: \"IRONSAT\", \"ZG71920\", \"ZENIA\"    No results found for: \"PH\", \"PHARTERIAL\", \"PO2\", \"HL9QMZAURHF\", \"SAT\", \"PCO2\", \"HCO3\", \"BASEEXCESS\", \"IAN\", \"BEB\"    @LABRCNTIPR(phv:4,pco2v:4,po2v:4,hco3v:4,lea:4,o2per:4)@    Echocardiology: No results found for this or any previous visit (from the past 4320 hours).    Chest x-ray:   No results found for this or any previous visit from the past 365 days.      Chest CT:   No results found for this or any previous visit from the past 365 days.      PFT: Most Recent [facility] Pulmonary Function Testing    No results found for: \"20001\"  No results found for: \"20002\"  No results found for: \"20003\"  No results found for: \"20015\"  No results found for: \"20016\"  No results found for: \"20027\"  No results found for: \"20028\"  No results found for: \"20029\"  No results found for: \"20079\"  No results found for: \"20080\"  No " "results found for: \"20081\"  No results found for: \"20335\"  No results found for: \"20105\"  No results found for: \"20053\"  No results found for: \"20054\"  No results found for: \"20055\"      AGATHA Smith CNP 5/8/2025   Sleep Medicine      This note was written with the assistance of the Dragon voice-dictation technology software. The final document, although reviewed, may contain errors. For corrections, please contact the office.  "

## 2025-06-22 DIAGNOSIS — J30.9 ALLERGIC RHINITIS, UNSPECIFIED SEASONALITY, UNSPECIFIED TRIGGER: ICD-10-CM

## 2025-06-23 RX ORDER — FLUTICASONE PROPIONATE 50 MCG
1 SPRAY, SUSPENSION (ML) NASAL DAILY
Qty: 32 G | Refills: 0 | Status: SHIPPED | OUTPATIENT
Start: 2025-06-23